# Patient Record
Sex: MALE | Race: BLACK OR AFRICAN AMERICAN | Employment: OTHER | URBAN - METROPOLITAN AREA
[De-identification: names, ages, dates, MRNs, and addresses within clinical notes are randomized per-mention and may not be internally consistent; named-entity substitution may affect disease eponyms.]

---

## 2017-02-09 LAB
A-G RATIO,AGRAT: 1.5 RATIO (ref 1.1–2.6)
ALBUMIN SERPL-MCNC: 4.3 G/DL (ref 3.5–5)
ALP SERPL-CCNC: 55 U/L (ref 40–125)
ALT SERPL-CCNC: 16 U/L (ref 5–40)
AST SERPL W P-5'-P-CCNC: 14 U/L (ref 10–37)
BILIRUB SERPL-MCNC: 0.6 MG/DL (ref 0.2–1.2)
BILIRUBIN, DIRECT,CBIL: <0.2 MG/DL (ref 0–0.3)
CHOLEST SERPL-MCNC: 135 MG/DL (ref 110–200)
GLOBULIN,GLOB: 2.8 G/DL (ref 2–4)
HDLC SERPL-MCNC: 34 MG/DL (ref 40–59)
LDLC SERPL CALC-MCNC: 63 MG/DL (ref 50–99)
PROT SERPL-MCNC: 7.1 G/DL (ref 6.2–8.1)
TRIGL SERPL-MCNC: 192 MG/DL (ref 40–149)
VLDLC SERPL CALC-MCNC: 38 MG/DL (ref 8–30)

## 2017-02-10 ENCOUNTER — TELEPHONE (OUTPATIENT)
Dept: CARDIOLOGY CLINIC | Age: 67
End: 2017-02-10

## 2017-02-10 NOTE — TELEPHONE ENCOUNTER
----- Message from Jordan Laird MD sent at 2/10/2017  2:06 PM EST -----  Lab/test  reviewed  No significant abnormality  High tg-low carb diet

## 2017-02-16 ENCOUNTER — OFFICE VISIT (OUTPATIENT)
Dept: CARDIOLOGY CLINIC | Age: 67
End: 2017-02-16

## 2017-02-16 VITALS
HEIGHT: 73 IN | DIASTOLIC BLOOD PRESSURE: 76 MMHG | WEIGHT: 205 LBS | HEART RATE: 64 BPM | BODY MASS INDEX: 27.17 KG/M2 | SYSTOLIC BLOOD PRESSURE: 120 MMHG

## 2017-02-16 DIAGNOSIS — Z95.1 POSTSURGICAL AORTOCORONARY BYPASS STATUS: ICD-10-CM

## 2017-02-16 DIAGNOSIS — I10 ESSENTIAL HYPERTENSION, BENIGN: ICD-10-CM

## 2017-02-16 DIAGNOSIS — E78.5 HYPERLIPIDEMIA, UNSPECIFIED HYPERLIPIDEMIA TYPE: ICD-10-CM

## 2017-02-16 DIAGNOSIS — I25.10 ATHEROSCLEROSIS OF NATIVE CORONARY ARTERY OF NATIVE HEART WITHOUT ANGINA PECTORIS: Primary | ICD-10-CM

## 2017-02-16 RX ORDER — METOPROLOL TARTRATE 50 MG/1
50 TABLET ORAL 2 TIMES DAILY
Qty: 180 TAB | Refills: 3 | Status: SHIPPED | OUTPATIENT
Start: 2017-02-16 | End: 2018-01-29 | Stop reason: SDUPTHER

## 2017-02-16 NOTE — PROGRESS NOTES
HISTORY OF PRESENT ILLNESS  Lenn Claude is a 77 y.o. male. HPI Comments: Patient with cad,post cabg,htn,dm. On follow up patient denies any chest pains,sob, palpitation or other significant symptoms. Hypertension   The history is provided by the patient. This is a chronic problem. The problem occurs constantly. The problem has not changed since onset. Pertinent negatives include no chest pain and no shortness of breath. Cholesterol Problem   The history is provided by the patient. This is a chronic problem. The problem occurs constantly. The problem has not changed since onset. Pertinent negatives include no chest pain and no shortness of breath. Review of Systems   Constitutional: Negative for chills and fever. HENT: Negative for nosebleeds. Eyes: Negative for blurred vision and double vision. Respiratory: Negative for cough, hemoptysis, sputum production, shortness of breath and wheezing. Cardiovascular: Negative for chest pain, palpitations, orthopnea, claudication, leg swelling and PND. Gastrointestinal: Negative for heartburn, nausea and vomiting. Musculoskeletal: Negative for myalgias. Skin: Negative for rash. Neurological: Negative for dizziness and weakness. Endo/Heme/Allergies: Does not bruise/bleed easily. No family history on file.     Past Medical History   Diagnosis Date    Coronary atherosclerosis of native coronary artery      Stable, now post CABG    Essential hypertension, benign      stable    Other and unspecified hyperlipidemia      LDL less than 100, HDL 37, LFT normal    Postsurgical aortocoronary bypass status     Type II or unspecified type diabetes mellitus without mention of complication, not stated as uncontrolled 11/15/2013       Past Surgical History   Procedure Laterality Date    Hx coronary artery bypass graft         Social History   Substance Use Topics    Smoking status: Former Smoker     Packs/day: 0.25     Quit date: 1/1/2005   Aunia Patrice Smokeless tobacco: Never Used    Alcohol use No       No Known Allergies    Current Outpatient Prescriptions   Medication Sig    metoprolol tartrate (LOPRESSOR) 50 mg tablet Take 1 Tab by mouth two (2) times a day.  clopidogrel (PLAVIX) 75 mg tab TAKE 1 TABLET DAILY    canagliflozin (INVOKANA) 100 mg tablet Take 100 mg by mouth Daily (before breakfast).  simvastatin (ZOCOR) 20 mg tablet Take 1 Tab by mouth nightly.  insulin glargine (LANTUS) 100 unit/mL injection by SubCUTAneous route nightly.  losartan (COZAAR) 100 mg tablet Take 1 Tab by mouth daily. (Patient taking differently: Take 50 mg by mouth daily.)    omega-3 fatty acids-vitamin e (FISH OIL) 1,000 mg cap Take 1 Cap by mouth.  metFORMIN (GLUCOPHAGE) 1,000 mg tablet Take 1,000 mg by mouth two (2) times daily (with meals).  cholecalciferol, vitamin D3, (VITAMIN D3) 2,000 unit Tab Take  by mouth daily. No current facility-administered medications for this visit. Visit Vitals    /76    Pulse 64    Ht 6' 1\" (1.854 m)    Wt 93 kg (205 lb)    BMI 27.05 kg/m2         Physical Exam   Constitutional: He is oriented to person, place, and time. He appears well-developed and well-nourished. HENT:   Head: Normocephalic and atraumatic. Eyes: Conjunctivae are normal.   Neck: Neck supple. No JVD present. No tracheal deviation present. No thyromegaly present. Cardiovascular: Normal rate, regular rhythm and normal heart sounds. Exam reveals no gallop and no friction rub. No murmur heard. Pulmonary/Chest: Breath sounds normal. No respiratory distress. He has no wheezes. He has no rales. He exhibits no tenderness. Abdominal: Soft. There is no tenderness. Musculoskeletal: He exhibits no edema. Neurological: He is alert and oriented to person, place, and time. Skin: Skin is warm and dry. Psychiatric: He has a normal mood and affect. Mr. Gagandeep Aguila has a reminder for a \"due or due soon\" health maintenance.  I have asked that he contact his primary care provider for follow-up on this health maintenance. CARDIOLOGY STUDIES 2011 3/27/2009   Myocardial Perfusion Scan Result - abn scan, small reversible ischemia in Basal part of inferior wall, EF 63%   Cardiac Cath with PCI Result - EF 60%; Triple Bypass    Echocardiogram - Complete Result MILD LVH,NORMAL EF,DD -     Diagnosis: 2014  1. Positive EKG portion of exercise stress test with inferior and lateral ST depression. 2. Shortness of breath and reduced functional tolerance with exercise. 3. Nuclear imaging report to follow. NUCLEAR IMAGIN2014     Findings:   1. Post-stress imaging in short axis, horizontal and vertical long axis views reveals normal isotope uptake in all areas. 2. Resting images also show normal isotope uptake in all areas. 3. Gated images show normal left ventricular size, wall motion and systolic function. Ejection fraction is 75%. Diagnosis:   1. Normal scan. 2. No evidence of significant fixed or reversible defect suggesting ischemia or myocardial infarction noted from this nuclear study. I Have personally reviewed recent relevant labs available and discussed with patient  2017-lipid  Assessment       ICD-10-CM ICD-9-CM    1. Atherosclerosis of native coronary artery of native heart without angina pectoris I25.10 414.01     stable   2. Essential hypertension, benign I10 401.1 metoprolol tartrate (LOPRESSOR) 50 mg tablet    controlled   3. Postsurgical aortocoronary bypass status Z95.1 V45.81    4. Hyperlipidemia, unspecified hyperlipidemia type E78.5 272.4     ldl 60's       Medications Discontinued During This Encounter   Medication Reason    Polyethylene Glycol 3350 Powd Other    metoprolol (LOPRESSOR) 50 mg tablet Reorder       Orders Placed This Encounter    metoprolol tartrate (LOPRESSOR) 50 mg tablet     Sig: Take 1 Tab by mouth two (2) times a day.      Dispense:  180 Tab     Refill:  3     **Patient requests 90 days supply**       Follow-up Disposition:  Return in about 6 months (around 8/16/2017).

## 2017-02-16 NOTE — MR AVS SNAPSHOT
Visit Information Date & Time Provider Department Dept. Phone Encounter #  
 2/16/2017  9:30 AM Eve Crisostomo MD [facility] [phone] [id number] Follow-up Instructions Return in about 6 months (around 8/16/2017). Upcoming Health Maintenance Date Due Hepatitis C Screening 1950 HEMOGLOBIN A1C Q6M 1950 FOOT EXAM Q1 7/14/1960 MICROALBUMIN Q1 7/14/1960 EYE EXAM RETINAL OR DILATED Q1 7/14/1960 DTaP/Tdap/Td series (1 - Tdap) 7/14/1971 FOBT Q 1 YEAR AGE 50-75 7/14/2000 ZOSTER VACCINE AGE 60> 7/14/2010 GLAUCOMA SCREENING Q2Y 7/14/2015 Pneumococcal 65+ Low/Medium Risk (1 of 2 - PCV13) 7/14/2015 MEDICARE YEARLY EXAM 7/14/2015 INFLUENZA AGE 9 TO ADULT 8/1/2016 LIPID PANEL Q1 2/9/2018 Allergies as of 2/16/2017  Review Complete On: 2/16/2017 By: Eve Crisostomo MD  
 No Known Allergies Current Immunizations  Never Reviewed No immunizations on file. Not reviewed this visit You Were Diagnosed With   
  
 Codes Comments Atherosclerosis of native coronary artery of native heart without angina pectoris    -  Primary ICD-10-CM: I25.10 ICD-9-CM: 414.01 stable Essential hypertension, benign     ICD-10-CM: I10 
ICD-9-CM: 401.1 controlled Postsurgical aortocoronary bypass status     ICD-10-CM: Z95.1 ICD-9-CM: V45.81 Hyperlipidemia, unspecified hyperlipidemia type     ICD-10-CM: E78.5 ICD-9-CM: 272.4 ldl 60's Vitals BP Pulse Height(growth percentile) Weight(growth percentile) BMI Smoking Status 120/76 64 6' 1\" (1.854 m) 205 lb (93 kg) 27.05 kg/m2 Former Smoker BMI and BSA Data Body Mass Index Body Surface Area  
 27.05 kg/m 2 2.19 m 2 Preferred Pharmacy Pharmacy Name [address] [facility] [phone] Your Updated Medication List  
  
   
 This list is accurate as of: 2/16/17  9:47 AM.  Always use your most recent med list.  
  
  
  
  
 clopidogrel 75 mg Tab Commonly known as:  PLAVIX TAKE 1 TABLET DAILY FISH OIL 1,000 mg Cap Generic drug:  omega-3 fatty acids-vitamin e Take 1 Cap by mouth. insulin glargine 100 unit/mL injection Commonly known as:  LANTUS  
by SubCUTAneous route nightly. INVOKANA 100 mg tablet Generic drug:  canagliflozin Take 100 mg by mouth Daily (before breakfast). losartan 100 mg tablet Commonly known as:  COZAAR Take 1 Tab by mouth daily. metFORMIN 1,000 mg tablet Commonly known as:  GLUCOPHAGE Take 1,000 mg by mouth two (2) times daily (with meals). metoprolol tartrate 50 mg tablet Commonly known as:  LOPRESSOR Take 1 Tab by mouth two (2) times a day. simvastatin 20 mg tablet Commonly known as:  ZOCOR Take 1 Tab by mouth nightly. VITAMIN D3 2,000 unit Tab Generic drug:  cholecalciferol (vitamin D3) Take  by mouth daily. Prescriptions Sent to Pharmacy Refills  
 metoprolol tartrate (LOPRESSOR) 50 mg tablet 3 Sig: Take 1 Tab by mouth two (2) times a day. Class: Normal  
 Pharmacy: [address], [address] Ph #: [phone] Route: Oral  
  
Follow-up Instructions Return in about 6 months (around 8/16/2017). Introducing [facility]! Marissa Friedman introduces [employer] patient portal. Now you can access parts of your medical record, email your doctor's office, and request medication refills online. 1. In your internet browser, go to [email or web address] 2. Click on the First Time User? Click Here link in the Sign In box. You will see the New Member Sign Up page. 3. Enter your [employer] Access Code exactly as it appears below. You will not need to use this code after youve completed the sign-up process.  If you do not sign up before the expiration date, you must request a new code. · [employer] Access Code: [email or web address] Expires: 5/17/2017  9:15 AM 
 
4. Enter the last four digits of your Social Security Number (xxxx) and Date of Birth (mm/dd/yyyy) as indicated and click Submit. You will be taken to the next sign-up page. 5. Create a [employer] Hero ID. This will be your [employer] login ID and cannot be changed, so think of one that is secure and easy to remember. 6. Create a [employer] password. You can change your password at any time. 7. Enter your Password Reset Question and Answer. This can be used at a later time if you forget your password. 8. Enter your e-mail address. You will receive e-mail notification when new information is available in [address] 9. Click Sign Up. You can now view and download portions of your medical record. 10. Click the Download Summary menu link to download a portable copy of your medical information. If you have questions, please visit the Frequently Asked Questions section of the [employer] website. Remember, [employer] is NOT to be used for urgent needs. For medical emergencies, dial 911. Now available from your iPhone and Android! Please provide this summary of care documentation to your next provider. Your primary care clinician is listed as Jone Mack. If you have any questions after today's visit, please call [phone].

## 2017-02-16 NOTE — PROGRESS NOTES
1. Have you been to the ER, urgent care clinic since your last visit? Hospitalized since your last visit?       no    2. Have you seen or consulted any other health care providers outside of the [facility] since your last visit? Include any pap smears or colon screening.     yes, pcp     3. Since your last visit, have you had any of the following symptoms?       no            4. Have you had any blood work, X-rays or cardiac testing? Yes, [facility]    5. Where do you normally have your labs drawn? [facility]    6. Do you need any refills today?   yes

## 2017-06-02 RX ORDER — SIMVASTATIN 20 MG/1
TABLET, FILM COATED ORAL
Qty: 90 TAB | Refills: 2 | Status: SHIPPED | OUTPATIENT
Start: 2017-06-02 | End: 2018-05-03 | Stop reason: SDUPTHER

## 2017-08-18 ENCOUNTER — OFFICE VISIT (OUTPATIENT)
Dept: CARDIOLOGY CLINIC | Age: 67
End: 2017-08-18

## 2017-08-18 VITALS
HEIGHT: 73 IN | DIASTOLIC BLOOD PRESSURE: 73 MMHG | HEART RATE: 69 BPM | SYSTOLIC BLOOD PRESSURE: 114 MMHG | BODY MASS INDEX: 25.84 KG/M2 | WEIGHT: 195 LBS

## 2017-08-18 DIAGNOSIS — E11.9 TYPE 2 DIABETES MELLITUS WITHOUT COMPLICATION, WITH LONG-TERM CURRENT USE OF INSULIN (HCC): ICD-10-CM

## 2017-08-18 DIAGNOSIS — E78.5 HYPERLIPIDEMIA, UNSPECIFIED HYPERLIPIDEMIA TYPE: ICD-10-CM

## 2017-08-18 DIAGNOSIS — I10 ESSENTIAL HYPERTENSION, BENIGN: ICD-10-CM

## 2017-08-18 DIAGNOSIS — I25.10 ATHEROSCLEROSIS OF NATIVE CORONARY ARTERY OF NATIVE HEART WITHOUT ANGINA PECTORIS: Primary | ICD-10-CM

## 2017-08-18 DIAGNOSIS — Z79.4 TYPE 2 DIABETES MELLITUS WITHOUT COMPLICATION, WITH LONG-TERM CURRENT USE OF INSULIN (HCC): ICD-10-CM

## 2017-08-18 DIAGNOSIS — Z95.1 POSTSURGICAL AORTOCORONARY BYPASS STATUS: ICD-10-CM

## 2017-08-18 NOTE — PROGRESS NOTES
HISTORY OF PRESENT ILLNESS  Fatmata Gallo is a 79 y.o. male. HPI Comments: Patient with cad,post cabg,htn,dm. On follow up patient denies any chest pains,sob, palpitation or other significant symptoms. Hypertension   The history is provided by the patient. This is a chronic problem. The problem occurs constantly. The problem has not changed since onset. Pertinent negatives include no chest pain and no shortness of breath. Cholesterol Problem   The history is provided by the patient. This is a chronic problem. The problem occurs constantly. The problem has not changed since onset. Pertinent negatives include no chest pain and no shortness of breath. Review of Systems   Constitutional: Negative for chills and fever. HENT: Negative for nosebleeds. Eyes: Negative for blurred vision and double vision. Respiratory: Negative for cough, hemoptysis, sputum production, shortness of breath and wheezing. Cardiovascular: Negative for chest pain, palpitations, orthopnea, claudication, leg swelling and PND. Gastrointestinal: Negative for heartburn, nausea and vomiting. Musculoskeletal: Negative for myalgias. Skin: Negative for rash. Neurological: Negative for dizziness and weakness. Endo/Heme/Allergies: Does not bruise/bleed easily. No family history on file.     Past Medical History:   Diagnosis Date    Coronary atherosclerosis of native coronary artery     Stable, now post CABG    Essential hypertension, benign     stable    Other and unspecified hyperlipidemia     LDL less than 100, HDL 37, LFT normal    Postsurgical aortocoronary bypass status     Type II or unspecified type diabetes mellitus without mention of complication, not stated as uncontrolled 11/15/2013       Past Surgical History:   Procedure Laterality Date    HX CORONARY ARTERY BYPASS GRAFT         Social History   Substance Use Topics    Smoking status: Former Smoker     Packs/day: 0.25     Quit date: 1/1/2005   [facility] Smokeless tobacco: Never Used    Alcohol use No       No Known Allergies    Current Outpatient Prescriptions   Medication Sig    simvastatin (ZOCOR) 20 mg tablet TAKE 1 TABLET NIGHTLY    metoprolol tartrate (LOPRESSOR) 50 mg tablet Take 1 Tab by mouth two (2) times a day.  clopidogrel (PLAVIX) 75 mg tab TAKE 1 TABLET DAILY    canagliflozin (INVOKANA) 100 mg tablet Take 100 mg by mouth Daily (before breakfast).  insulin glargine (LANTUS) 100 unit/mL injection by SubCUTAneous route nightly.  losartan (COZAAR) 100 mg tablet Take 1 Tab by mouth daily. (Patient taking differently: Take 50 mg by mouth daily.)    omega-3 fatty acids-vitamin e (FISH OIL) 1,000 mg cap Take 1 Cap by mouth.  metFORMIN (GLUCOPHAGE) 1,000 mg tablet Take 1,000 mg by mouth two (2) times daily (with meals).  cholecalciferol, vitamin D3, (VITAMIN D3) 2,000 unit Tab Take  by mouth daily. No current facility-administered medications for this visit. Visit Vitals    /73    Pulse 69    Ht 6' 1\" (1.854 m)    Wt 88.5 kg (195 lb)    BMI 25.73 kg/m2         Physical Exam   Constitutional: He is oriented to person, place, and time. He appears well-developed and well-nourished. HENT:   Head: Normocephalic and atraumatic. Eyes: Conjunctivae are normal.   Neck: Neck supple. No JVD present. No tracheal deviation present. No thyromegaly present. Cardiovascular: Normal rate, regular rhythm and normal heart sounds. Exam reveals no gallop and no friction rub. No murmur heard. Pulmonary/Chest: Breath sounds normal. No respiratory distress. He has no wheezes. He has no rales. He exhibits no tenderness. Abdominal: Soft. There is no tenderness. Musculoskeletal: He exhibits no edema. Neurological: He is alert and oriented to person, place, and time. Skin: Skin is warm and dry. Psychiatric: He has a normal mood and affect. Mr. Veronica Steele has a reminder for a \"due or due soon\" health maintenance.  I have asked that he contact his primary care provider for follow-up on this health maintenance. CARDIOLOGY STUDIES 2011 3/27/2009   Myocardial Perfusion Scan Result - abn scan, small reversible ischemia in Basal part of inferior wall, EF 63%   Cardiac Cath with PCI Result - EF 60%; Triple Bypass    Echocardiogram - Complete Result MILD LVH,NORMAL EF,DD -     Diagnosis: 2014  1. Positive EKG portion of exercise stress test with inferior and lateral ST depression. 2. Shortness of breath and reduced functional tolerance with exercise. 3. Nuclear imaging report to follow. NUCLEAR [email or web address]     Findings:   1. Post-stress imaging in short axis, horizontal and vertical long axis views reveals normal isotope uptake in all areas. 2. Resting images also show normal isotope uptake in all areas. 3. Gated images show normal left ventricular size, wall motion and systolic function. Ejection fraction is 75%. Diagnosis:   1. Normal scan. 2. No evidence of significant fixed or reversible defect suggesting ischemia or myocardial infarction noted from this nuclear study. I Have personally reviewed recent relevant labs available and discussed with patient  2017-lipid  Assessment       ICD-10-CM ICD-9-CM    1. Atherosclerosis of native coronary artery of native heart without angina pectoris I25.10 414.01     stable   2. Essential hypertension, benign I10 401.1     controlled   3. Hyperlipidemia, unspecified hyperlipidemia type E78.5 272.4     ldl 60's  high tg-low carb diet   4. Postsurgical aortocoronary bypass status Z95.1 V45.81    5. Type 2 diabetes mellitus without complication, with long-term current use of insulin ([facility]) E11.9 250.00     Z79.4 V58.67     high hg a1c  discussed  diet       There are no discontinued medications. No orders of the defined types were placed in this encounter. Follow-up Disposition:  Return in about 6 months (around 2018).

## 2017-08-18 NOTE — PROGRESS NOTES
1. Have you been to the ER, urgent care clinic since your last visit? Hospitalized since your last visit? No    2. Have you seen or consulted any other health care providers outside of the [address] [place] [address] since your last visit? Include any pap smears or colon screening. Yes Where: PCP     3. Since your last visit, have you had any of the following symptoms? .          4. Have you had any blood work, X-rays or cardiac testing? No              5.  Where do you normally have your labs drawn? [facility]    6. Do you need any refills today?    No

## 2017-08-18 NOTE — LETTER
Yvrose Cartagena 1950 8/18/2017 Dear [place] Schwab, MD Cherrie Mane, MD Leslye Bride, MD 
 
I had the pleasure of evaluating  Mr. Emory Bean in office today. Below are the relevant portions of my assessment and plan of care. ICD-10-CM ICD-9-CM 1. Atherosclerosis of native coronary artery of native heart without angina pectoris I25.10 414.01   
 stable 2. Essential hypertension, benign I10 401.1   
 controlled 3. Hyperlipidemia, unspecified hyperlipidemia type E78.5 272.4   
 ldl 60's 
high tg-low carb diet 4. Postsurgical aortocoronary bypass status Z95.1 V45.81   
5. Type 2 diabetes mellitus without complication, with long-term current use of insulin ([facility]) E11.9 250.00   
 Z79.4 V58.67   
 high hg a1c 
discussed  diet Current Outpatient Prescriptions Medication Sig Dispense Refill  simvastatin (ZOCOR) 20 mg tablet TAKE 1 TABLET NIGHTLY 90 Tab 2  
 metoprolol tartrate (LOPRESSOR) 50 mg tablet Take 1 Tab by mouth two (2) times a day. 180 Tab 3  clopidogrel (PLAVIX) 75 mg tab TAKE 1 TABLET DAILY 90 Tab 2  
 canagliflozin (INVOKANA) 100 mg tablet Take 100 mg by mouth Daily (before breakfast).  insulin glargine (LANTUS) 100 unit/mL injection by SubCUTAneous route nightly.  losartan (COZAAR) 100 mg tablet Take 1 Tab by mouth daily. (Patient taking differently: Take 50 mg by mouth daily.) 90 Tab 3  
 omega-3 fatty acids-vitamin e (FISH OIL) 1,000 mg cap Take 1 Cap by mouth.  metFORMIN (GLUCOPHAGE) 1,000 mg tablet Take 1,000 mg by mouth two (2) times daily (with meals).  cholecalciferol, vitamin D3, (VITAMIN D3) 2,000 unit Tab Take  by mouth daily. No orders of the defined types were placed in this encounter. If you have questions, please do not hesitate to call me. I look forward to following Mr. Emory Bean along with you. Sincerely, Becky Davis MD

## 2017-08-18 NOTE — MR AVS SNAPSHOT
Visit Information Date & Time Provider Department Dept. Phone Encounter #  
 8/18/2017  9:30 AM Edilma Mcneill MD [facility] [phone] Follow-up Instructions Return in about 6 months (around 2/18/2018). Upcoming Health Maintenance Date Due Hepatitis C Screening 1950 HEMOGLOBIN A1C Q6M 1950 FOOT EXAM Q1 7/14/1960 MICROALBUMIN Q1 7/14/1960 EYE EXAM RETINAL OR DILATED Q1 7/14/1960 DTaP/Tdap/Td series (1 - Tdap) 7/14/1971 FOBT Q 1 YEAR AGE 50-75 7/14/2000 ZOSTER VACCINE AGE 60> 5/14/2010 GLAUCOMA SCREENING Q2Y 7/14/2015 Pneumococcal 65+ Low/Medium Risk (1 of 2 - PCV13) 7/14/2015 MEDICARE YEARLY EXAM 7/14/2015 INFLUENZA AGE 9 TO ADULT 8/1/2017 LIPID PANEL Q1 2/9/2018 Allergies as of 8/18/2017  Review Complete On: 8/18/2017 By: Edilma Mcneill MD  
 No Known Allergies Current Immunizations  Never Reviewed No immunizations on file. Not reviewed this visit You Were Diagnosed With   
  
 Codes Comments Atherosclerosis of native coronary artery of native heart without angina pectoris    -  Primary ICD-10-CM: I25.10 ICD-9-CM: 414.01 stable Essential hypertension, benign     ICD-10-CM: I10 
ICD-9-CM: 401.1 controlled Hyperlipidemia, unspecified hyperlipidemia type     ICD-10-CM: E78.5 ICD-9-CM: 272.4 ldl 60's 
high tg-low carb diet Postsurgical aortocoronary bypass status     ICD-10-CM: Z95.1 ICD-9-CM: V45.81 Type 2 diabetes mellitus without complication, with long-term current use of insulin (HCC)     ICD-10-CM: E11.9, Z79.4 ICD-9-CM: 250.00, V58.67 high hg a1c 
discussed  diet Vitals BP Pulse Height(growth percentile) Weight(growth percentile) BMI Smoking Status 114/73 69 6' 1\" (1.854 m) 195 lb (88.5 kg) 25.73 kg/m2 Former Smoker Vitals History BMI and BSA Data Body Mass Index Body Surface Area 25.73 kg/m 2 2.14 m 2 Preferred Pharmacy Pharmacy Name Phone [address], [facility] [phone] Your Updated Medication List  
  
   
This list is accurate as of: 8/18/17  9:59 AM.  Always use your most recent med list.  
  
  
  
  
 clopidogrel 75 mg Tab Commonly known as:  PLAVIX TAKE 1 TABLET DAILY FISH OIL 1,000 mg Cap Generic drug:  omega-3 fatty acids-vitamin e Take 1 Cap by mouth. insulin glargine 100 unit/mL injection Commonly known as:  LANTUS  
by SubCUTAneous route nightly. INVOKANA 100 mg tablet Generic drug:  canagliflozin Take 100 mg by mouth Daily (before breakfast). losartan 100 mg tablet Commonly known as:  COZAAR Take 1 Tab by mouth daily. metFORMIN 1,000 mg tablet Commonly known as:  GLUCOPHAGE Take 1,000 mg by mouth two (2) times daily (with meals). metoprolol tartrate 50 mg tablet Commonly known as:  LOPRESSOR Take 1 Tab by mouth two (2) times a day. simvastatin 20 mg tablet Commonly known as:  ZOCOR  
TAKE 1 TABLET NIGHTLY  
  
 VITAMIN D3 2,000 unit Tab Generic drug:  cholecalciferol (vitamin D3) Take  by mouth daily. Follow-up Instructions Return in about 6 months (around 2/18/2018). Introducing [facility] & HEALTH SERVICES! Wilton Huntley introduces [employer] patient portal. Now you can access parts of your medical record, email your doctor's office, and request medication refills online. 1. In your internet browser, go to [email or web address] 2. Click on the First Time User? Click Here link in the Sign In box. You will see the New Member Sign Up page. 3. Enter your [employer] Access Code exactly as it appears below. You will not need to use this code after youve completed the sign-up process. If you do not sign up before the expiration date, you must request a new code. · KKBOX Access Code: [email or web address] Expires: 11/16/2017  9:19 AM 
 
 4. Enter the last four digits of your Social Security Number (xxxx) and Date of Birth (mm/dd/yyyy) as indicated and click Submit. You will be taken to the next sign-up page. 5. Create a Guest[employer] Network ID. This will be your Guest[employer] login ID and cannot be changed, so think of one that is secure and easy to remember. 6. Create a [employer] Network password. You can change your password at any time. 7. Enter your Password Reset Question and Answer. This can be used at a later time if you forget your password. 8. Enter your e-mail address. You will receive e-mail notification when new information is available in [address] 9. Click Sign Up. You can now view and download portions of your medical record. 10. Click the Download Summary menu link to download a portable copy of your medical information. If you have questions, please visit the Frequently Asked Questions section of the [employer] website. Remember, [employer] is NOT to be used for urgent needs. For medical emergencies, dial 911. Now available from your iPhone and Android! Please provide this summary of care documentation to your next provider. Your primary care clinician is listed as Jaky Lamas. If you have any questions after today's visit, please call [phone].

## 2017-09-26 RX ORDER — CLOPIDOGREL BISULFATE 75 MG/1
TABLET ORAL
Qty: 90 TAB | Refills: 2 | Status: SHIPPED | OUTPATIENT
Start: 2017-09-26 | End: 2018-07-05 | Stop reason: SDUPTHER

## 2018-01-29 DIAGNOSIS — I10 ESSENTIAL HYPERTENSION, BENIGN: ICD-10-CM

## 2018-01-29 RX ORDER — METOPROLOL TARTRATE 50 MG/1
TABLET ORAL
Qty: 180 TAB | Refills: 3 | Status: SHIPPED | OUTPATIENT
Start: 2018-01-29 | End: 2019-01-06 | Stop reason: SDUPTHER

## 2018-02-02 ENCOUNTER — OFFICE VISIT (OUTPATIENT)
Dept: CARDIOLOGY CLINIC | Age: 68
End: 2018-02-02

## 2018-02-02 VITALS
HEART RATE: 60 BPM | SYSTOLIC BLOOD PRESSURE: 146 MMHG | HEIGHT: 73 IN | DIASTOLIC BLOOD PRESSURE: 73 MMHG | WEIGHT: 207 LBS | BODY MASS INDEX: 27.43 KG/M2

## 2018-02-02 DIAGNOSIS — I25.10 ATHEROSCLEROSIS OF NATIVE CORONARY ARTERY OF NATIVE HEART WITHOUT ANGINA PECTORIS: Primary | ICD-10-CM

## 2018-02-02 DIAGNOSIS — Z95.1 POSTSURGICAL AORTOCORONARY BYPASS STATUS: ICD-10-CM

## 2018-02-02 DIAGNOSIS — I10 ESSENTIAL HYPERTENSION, BENIGN: ICD-10-CM

## 2018-02-02 DIAGNOSIS — Z79.4 TYPE 2 DIABETES MELLITUS WITHOUT COMPLICATION, WITH LONG-TERM CURRENT USE OF INSULIN (HCC): ICD-10-CM

## 2018-02-02 DIAGNOSIS — E78.5 HYPERLIPIDEMIA, UNSPECIFIED HYPERLIPIDEMIA TYPE: ICD-10-CM

## 2018-02-02 DIAGNOSIS — E11.9 TYPE 2 DIABETES MELLITUS WITHOUT COMPLICATION, WITH LONG-TERM CURRENT USE OF INSULIN (HCC): ICD-10-CM

## 2018-02-02 RX ORDER — AMLODIPINE BESYLATE 5 MG/1
5 TABLET ORAL DAILY
Qty: 90 TAB | Refills: 3 | Status: SHIPPED | OUTPATIENT
Start: 2018-02-02 | End: 2019-03-08 | Stop reason: SDUPTHER

## 2018-02-02 NOTE — LETTER
Cinda Timmons 1950 2/2/2018 Dear MD Jeovanny Solis MD Vaughan Orchard, MD 
 
I had the pleasure of evaluating  Mr. Conner Meza in office today. Below are the relevant portions of my assessment and plan of care. ICD-10-CM ICD-9-CM 1. Atherosclerosis of native coronary artery of native heart without angina pectoris I25.10 414.01 LIPID PANEL  
   HEPATIC FUNCTION PANEL  
 stable 2. Essential hypertension, benign I10 401.1   
 mildly elevated 
add amlodipine 3. Hyperlipidemia, unspecified hyperlipidemia type E78.5 272.4   
 on statin 4. Postsurgical aortocoronary bypass status Z95.1 V45.81   
 stable 5. Type 2 diabetes mellitus without complication, with long-term current use of insulin (HCC) E11.9 250.00   
 Z79.4 V58.67 Current Outpatient Prescriptions Medication Sig Dispense Refill  amLODIPine (NORVASC) 5 mg tablet Take 1 Tab by mouth daily. 90 Tab 3  
 metoprolol tartrate (LOPRESSOR) 50 mg tablet TAKE 1 TABLET TWICE A  Tab 3  clopidogrel (PLAVIX) 75 mg tab TAKE 1 TABLET DAILY 90 Tab 2  
 simvastatin (ZOCOR) 20 mg tablet TAKE 1 TABLET NIGHTLY 90 Tab 2  
 canagliflozin (INVOKANA) 100 mg tablet Take 100 mg by mouth Daily (before breakfast).  insulin glargine (LANTUS) 100 unit/mL injection by SubCUTAneous route nightly.  losartan (COZAAR) 100 mg tablet Take 1 Tab by mouth daily. (Patient taking differently: Take 50 mg by mouth daily.) 90 Tab 3  
 omega-3 fatty acids-vitamin e (FISH OIL) 1,000 mg cap Take 1 Cap by mouth.  metFORMIN (GLUCOPHAGE) 1,000 mg tablet Take 1,000 mg by mouth two (2) times daily (with meals).  cholecalciferol, vitamin D3, (VITAMIN D3) 2,000 unit Tab Take  by mouth daily. Orders Placed This Encounter  LIPID PANEL Standing Status:   Future Standing Expiration Date:   8/3/2018  
 HEPATIC FUNCTION PANEL Standing Status:   Future Standing Expiration Date:   8/3/2018  amLODIPine (NORVASC) 5 mg tablet Sig: Take 1 Tab by mouth daily. Dispense:  90 Tab Refill:  3 If you have questions, please do not hesitate to call me. I look forward to following Mr. Jeff Castillo along with you. Sincerely, Jhony Smith MD

## 2018-02-02 NOTE — MR AVS SNAPSHOT
[address] [facility] 
 
 
 [address] [facility] 
[phone] Patient: Katlin Millan MRN: [id number] EUO:[id number] Visit Information Date & Time Provider Department Dept. Phone Encounter #  
 2/2/2018  9:00 AM Derian Alexander MD [facility] [phone] [id number] Follow-up Instructions Return in about 6 months (around 8/2/2018). Your Appointments 8/16/2018  9:00 AM  
Follow Up with Derian Alexander MD  
[facility] ([facility]) Appt Note: 6 month [address] [facility] [address]  
  
   
 [address] [address] [address] [place] [address] Upcoming Health Maintenance Date Due Hepatitis C Screening 1950 HEMOGLOBIN A1C Q6M 1950 FOOT EXAM Q1 7/14/1960 MICROALBUMIN Q1 7/14/1960 EYE EXAM RETINAL OR DILATED Q1 7/14/1960 DTaP/Tdap/Td series (1 - Tdap) 7/14/1971 FOBT Q 1 YEAR AGE 50-75 7/14/2000 ZOSTER VACCINE AGE 60> 5/14/2010 GLAUCOMA SCREENING Q2Y 7/14/2015 Pneumococcal 65+ Low/Medium Risk (1 of 2 - PCV13) 7/14/2015 MEDICARE YEARLY EXAM 7/14/2015 Influenza Age 5 to Adult 8/1/2017 LIPID PANEL Q1 2/9/2018 Allergies as of 2/2/2018  Review Complete On: 2/2/2018 By: Derian Alexander MD  
 No Known Allergies Current Immunizations  Never Reviewed No immunizations on file. Not reviewed this visit You Were Diagnosed With   
  
 Codes Comments Atherosclerosis of native coronary artery of native heart without angina pectoris    -  Primary ICD-10-CM: I25.10 ICD-9-CM: 414.01 stable Essential hypertension, benign     ICD-10-CM: I10 
ICD-9-CM: 401.1 mildly elevated 
add amlodipine Hyperlipidemia, unspecified hyperlipidemia type     ICD-10-CM: E78.5 ICD-9-CM: 272.4 on statin Postsurgical aortocoronary bypass status     ICD-10-CM: Z95.1 ICD-9-CM: V45.81 stable Type 2 diabetes mellitus without complication, with long-term current use of insulin (HCC)     ICD-10-CM: E11.9, Z79.4 ICD-9-CM: 250.00, V58.67 Vitals BP Pulse Height(growth percentile) Weight(growth percentile) BMI Smoking Status 146/73 60 6' 1\" (1.854 m) 207 lb (93.9 kg) 27.31 kg/m2 Former Smoker Vitals History BMI and BSA Data Body Mass Index Body Surface Area  
 27.31 kg/m 2 2.2 m 2 Preferred Pharmacy Pharmacy Name Phone [address] [facility] [phone] Your Updated Medication List  
  
   
This list is accurate as of: 2/2/18  9:10 AM.  Always use your most recent med list. amLODIPine 5 mg tablet Commonly known as:  Yeyo Nearing Take 1 Tab by mouth daily. clopidogrel 75 mg Tab Commonly known as:  PLAVIX TAKE 1 TABLET DAILY FISH OIL 1,000 mg Cap Generic drug:  omega-3 fatty acids-vitamin e Take 1 Cap by mouth. insulin glargine 100 unit/mL injection Commonly known as:  LANTUS  
by SubCUTAneous route nightly. INVOKANA 100 mg tablet Generic drug:  canagliflozin Take 100 mg by mouth Daily (before breakfast). losartan 100 mg tablet Commonly known as:  COZAAR Take 1 Tab by mouth daily. metFORMIN 1,000 mg tablet Commonly known as:  GLUCOPHAGE Take 1,000 mg by mouth two (2) times daily (with meals). metoprolol tartrate 50 mg tablet Commonly known as:  LOPRESSOR  
TAKE 1 TABLET TWICE A DAY  
  
 simvastatin 20 mg tablet Commonly known as:  ZOCOR  
TAKE 1 TABLET NIGHTLY  
  
 VITAMIN D3 2,000 unit Tab Generic drug:  cholecalciferol (vitamin D3) Take  by mouth daily. Prescriptions Sent to Pharmacy Refills  
 amLODIPine (NORVASC) 5 mg tablet 3 Sig: Take 1 Tab by mouth daily. Class: Normal  
 Pharmacy: [address], [address] [place] [address] #: [phone]  Route: Oral  
  
 Follow-up Instructions Return in about 6 months (around 8/2/2018). To-Do List   
 02/02/2018 Lab:  HEPATIC FUNCTION PANEL   
  
 02/02/2018 Lab:  LIPID PANEL Introducing [facility] SERVICES! Carmelita Fothergill introduces [employer]ial[employer] patient portal. Now you can access parts of your medical record, email your doctor's office, and request medication refills online. 1. In your internet browser, go to [email or web address] 2. Click on the First Time User? Click Here link in the Sign In box. You will see the New Member Sign Up page. 3. Enter your AxialMED Access Code exactly as it appears below. You will not need to use this code after youve completed the sign-up process. If you do not sign up before the expiration date, you must request a new code. · [employer]ial[employer] Access Code: [email or web address] Expires: 5/3/2018  8:46 AM 
 
4. Enter the last four digits of your Social Security Number (xxxx) and Date of Birth (mm/dd/yyyy) as indicated and click Submit. You will be taken to the next sign-up page. 5. Create a AxialMED ID. This will be your [employer]ialMED login ID and cannot be changed, so think of one that is secure and easy to remember. 6. Create a [employer]ial[employer] password. You can change your password at any time. 7. Enter your Password Reset Question and Answer. This can be used at a later time if you forget your password. 8. Enter your e-mail address. You will receive e-mail notification when new information is available in [address] 9. Click Sign Up. You can now view and download portions of your medical record. 10. Click the Download Summary menu link to download a portable copy of your medical information. If you have questions, please visit the Frequently Asked Questions section of the Axial[employer] website. Remember, [employer] is NOT to be used for urgent needs. For medical emergencies, dial 911. Now available from your iPhone and Android! Please provide this summary of care documentation to your next provider. Your primary care clinician is listed as Anupmaa Lopez. If you have any questions after today's visit, please call [phone].

## 2018-02-02 NOTE — PROGRESS NOTES
HISTORY OF PRESENT ILLNESS  Huey Rodriguez is a 79 y.o. male. HPI Comments: Patient with cad,post cabg,htn,dm. On follow up patient denies any chest pains,sob, palpitation or other significant symptoms. Hypertension   The history is provided by the patient. This is a chronic problem. The problem occurs constantly. The problem has not changed since onset. Pertinent negatives include no chest pain and no shortness of breath. Cholesterol Problem   The history is provided by the patient. This is a chronic problem. The problem occurs constantly. The problem has not changed since onset. Pertinent negatives include no chest pain and no shortness of breath. Review of Systems   Constitutional: Negative for chills and fever. HENT: Negative for nosebleeds. Eyes: Negative for blurred vision and double vision. Respiratory: Negative for cough, hemoptysis, sputum production, shortness of breath and wheezing. Cardiovascular: Negative for chest pain, palpitations, orthopnea, claudication, leg swelling and PND. Gastrointestinal: Negative for heartburn, nausea and vomiting. Musculoskeletal: Negative for myalgias. Skin: Negative for rash. Neurological: Negative for dizziness and weakness. Endo/Heme/Allergies: Does not bruise/bleed easily. No family history on file.     Past Medical History:   Diagnosis Date    Coronary atherosclerosis of native coronary artery     Stable, now post CABG    Essential hypertension, benign     stable    Other and unspecified hyperlipidemia     LDL less than 100, HDL 37, LFT normal    Postsurgical aortocoronary bypass status     Type II or unspecified type diabetes mellitus without mention of complication, not stated as uncontrolled 11/15/2013       Past Surgical History:   Procedure Laterality Date    HX CORONARY ARTERY BYPASS GRAFT         Social History   Substance Use Topics    Smoking status: Former Smoker     Packs/day: 0.25     Quit date: 1/1/2005   [facility] Smokeless tobacco: Never Used    Alcohol use No       No Known Allergies    Current Outpatient Prescriptions   Medication Sig    amLODIPine (NORVASC) 5 mg tablet Take 1 Tab by mouth daily.  metoprolol tartrate (LOPRESSOR) 50 mg tablet TAKE 1 TABLET TWICE A DAY    clopidogrel (PLAVIX) 75 mg tab TAKE 1 TABLET DAILY    simvastatin (ZOCOR) 20 mg tablet TAKE 1 TABLET NIGHTLY    canagliflozin (INVOKANA) 100 mg tablet Take 100 mg by mouth Daily (before breakfast).  insulin glargine (LANTUS) 100 unit/mL injection by SubCUTAneous route nightly.  losartan (COZAAR) 100 mg tablet Take 1 Tab by mouth daily. (Patient taking differently: Take 50 mg by mouth daily.)    omega-3 fatty acids-vitamin e (FISH OIL) 1,000 mg cap Take 1 Cap by mouth.  metFORMIN (GLUCOPHAGE) 1,000 mg tablet Take 1,000 mg by mouth two (2) times daily (with meals).  cholecalciferol, vitamin D3, (VITAMIN D3) 2,000 unit Tab Take  by mouth daily. No current facility-administered medications for this visit. Visit Vitals    /73    Pulse 60    Ht 6' 1\" (1.854 m)    Wt 93.9 kg (207 lb)    BMI 27.31 kg/m2         Physical Exam   Constitutional: He is oriented to person, place, and time. He appears well-developed and well-nourished. HENT:   Head: Normocephalic and atraumatic. Eyes: Conjunctivae are normal.   Neck: Neck supple. No JVD present. No tracheal deviation present. No thyromegaly present. Cardiovascular: Normal rate, regular rhythm and normal heart sounds. Exam reveals no gallop and no friction rub. No murmur heard. Pulmonary/Chest: Breath sounds normal. No respiratory distress. He has no wheezes. He has no rales. He exhibits no tenderness. Abdominal: Soft. There is no tenderness. Musculoskeletal: He exhibits no edema. Neurological: He is alert and oriented to person, place, and time. Skin: Skin is warm and dry. Psychiatric: He has a normal mood and affect.      Mr. Reyes Sicilian has a reminder for a \"due or due soon\" health maintenance. I have asked that he contact his primary care provider for follow-up on this health maintenance. CARDIOLOGY STUDIES 2011 3/27/2009   Myocardial Perfusion Scan Result - abn scan, small reversible ischemia in Basal part of inferior wall, EF 63%   Cardiac Cath with PCI Result - EF 60%; Triple Bypass    Echocardiogram - Complete Result MILD LVH,NORMAL EF,DD -     Diagnosis: 2014  1. Positive EKG portion of exercise stress test with inferior and lateral ST depression. 2. Shortness of breath and reduced functional tolerance with exercise. 3. Nuclear imaging report to follow. NUCLEAR IMAGIN2014     Findings:   1. Post-stress imaging in short axis, horizontal and vertical long axis views reveals normal isotope uptake in all areas. 2. Resting images also show normal isotope uptake in all areas. 3. Gated images show normal left ventricular size, wall motion and systolic function. Ejection fraction is 75%. Diagnosis:   1. Normal scan. 2. No evidence of significant fixed or reversible defect suggesting ischemia or myocardial infarction noted from this nuclear study. I Have personally reviewed recent relevant labs available and discussed with patient  2017-lipid  Assessment       ICD-10-CM ICD-9-CM    1. Atherosclerosis of native coronary artery of native heart without angina pectoris I25.10 414.01 LIPID PANEL      HEPATIC FUNCTION PANEL    stable   2. Essential hypertension, benign I10 401.1     mildly elevated  add amlodipine   3. Hyperlipidemia, unspecified hyperlipidemia type E78.5 272.4     on statin   4. Postsurgical aortocoronary bypass status Z95.1 V45.81     stable   5. Type 2 diabetes mellitus without complication, with long-term current use of insulin ([facility]) E11.9 250.00     Z79.4 V58.67      2017  Consider stress est at next visit  There are no discontinued medications.     Orders Placed This Encounter    LIPID PANEL     Standing Status:   Future     Standing Expiration Date:   8/3/2018    HEPATIC FUNCTION PANEL     Standing Status:   Future     Standing Expiration Date:   8/3/2018    amLODIPine (NORVASC) 5 mg tablet     Sig: Take 1 Tab by mouth daily. Dispense:  90 Tab     Refill:  3       Follow-up Disposition:  Return in about 6 months (around 8/2/2018).

## 2018-02-02 NOTE — PROGRESS NOTES
1. Have you been to the ER, urgent care clinic since your last visit? Hospitalized since your last visit? No    2. Have you seen or consulted any other health care providers outside of the [address] [place] [address] since your last visit? Include any pap smears or colon screening. Yes Where: PCP     3. Since your last visit, have you had any of the following symptoms? .         4. Have you had any blood work, X-rays or cardiac testing? No          5. Where do you normally have your labs drawn? [facility]    6. Do you need any refills today?    No

## 2018-05-03 RX ORDER — SIMVASTATIN 20 MG/1
TABLET, FILM COATED ORAL
Qty: 90 TAB | Refills: 2 | Status: SHIPPED | OUTPATIENT
Start: 2018-05-03 | End: 2019-01-06 | Stop reason: SDUPTHER

## 2018-07-05 RX ORDER — CLOPIDOGREL BISULFATE 75 MG/1
TABLET ORAL
Qty: 90 TAB | Refills: 2 | Status: SHIPPED | OUTPATIENT
Start: 2018-07-05 | End: 2019-03-29 | Stop reason: SDUPTHER

## 2018-08-09 LAB
A-G RATIO,AGRAT: 1.5 RATIO (ref 1.1–2.6)
ALBUMIN SERPL-MCNC: 4.2 G/DL (ref 3.5–5)
ALP SERPL-CCNC: 54 U/L (ref 40–125)
ALT SERPL-CCNC: 13 U/L (ref 5–40)
AST SERPL W P-5'-P-CCNC: 14 U/L (ref 10–37)
BILIRUB SERPL-MCNC: 0.9 MG/DL (ref 0.2–1.2)
BILIRUBIN, DIRECT,CBIL: <0.2 MG/DL (ref 0–0.3)
CHOLEST SERPL-MCNC: 108 MG/DL (ref 110–200)
GLOBULIN,GLOB: 2.8 G/DL (ref 2–4)
HDLC SERPL-MCNC: 3 MG/DL (ref 0–5)
HDLC SERPL-MCNC: 36 MG/DL (ref 40–59)
LDLC SERPL CALC-MCNC: 52 MG/DL (ref 50–99)
PROT SERPL-MCNC: 7 G/DL (ref 6.2–8.1)
TRIGL SERPL-MCNC: 98 MG/DL (ref 40–149)
VLDLC SERPL CALC-MCNC: 20 MG/DL (ref 8–30)

## 2018-08-10 ENCOUNTER — TELEPHONE (OUTPATIENT)
Dept: CARDIOLOGY CLINIC | Age: 68
End: 2018-08-10

## 2018-08-10 NOTE — TELEPHONE ENCOUNTER
Called and left message on patient voicemail regarding lab/test results, Per Dr. Yvonne Palmer no significant abnormality. If any questions to call office.

## 2018-08-10 NOTE — TELEPHONE ENCOUNTER
----- Message from Vamsi Tenorio MD sent at 8/10/2018 11:12 AM EDT -----  Lab/test  reviewed  No significant abnormality

## 2018-08-16 ENCOUNTER — OFFICE VISIT (OUTPATIENT)
Dept: CARDIOLOGY CLINIC | Age: 68
End: 2018-08-16

## 2018-08-16 VITALS
SYSTOLIC BLOOD PRESSURE: 117 MMHG | DIASTOLIC BLOOD PRESSURE: 65 MMHG | HEIGHT: 73 IN | BODY MASS INDEX: 26.64 KG/M2 | WEIGHT: 201 LBS | HEART RATE: 66 BPM

## 2018-08-16 DIAGNOSIS — I10 ESSENTIAL HYPERTENSION, BENIGN: ICD-10-CM

## 2018-08-16 DIAGNOSIS — E78.5 HYPERLIPIDEMIA, UNSPECIFIED HYPERLIPIDEMIA TYPE: ICD-10-CM

## 2018-08-16 DIAGNOSIS — Z95.1 POSTSURGICAL AORTOCORONARY BYPASS STATUS: ICD-10-CM

## 2018-08-16 DIAGNOSIS — Z79.4 TYPE 2 DIABETES MELLITUS WITHOUT COMPLICATION, WITH LONG-TERM CURRENT USE OF INSULIN (HCC): ICD-10-CM

## 2018-08-16 DIAGNOSIS — I25.10 ATHEROSCLEROSIS OF NATIVE CORONARY ARTERY OF NATIVE HEART WITHOUT ANGINA PECTORIS: Primary | ICD-10-CM

## 2018-08-16 DIAGNOSIS — E11.9 TYPE 2 DIABETES MELLITUS WITHOUT COMPLICATION, WITH LONG-TERM CURRENT USE OF INSULIN (HCC): ICD-10-CM

## 2018-08-16 NOTE — PROGRESS NOTES
HISTORY OF PRESENT ILLNESS  Irasema Abdalla is a 76 y.o. male. HPI Comments: Patient with cad,post cabg,htn,dm. On follow up patient denies any chest pains,sob, palpitation or other significant symptoms. Hypertension   The history is provided by the patient. This is a chronic problem. The problem occurs constantly. The problem has not changed since onset. Pertinent negatives include no chest pain and no shortness of breath. Cholesterol Problem   The history is provided by the patient. This is a chronic problem. The problem occurs constantly. The problem has not changed since onset. Pertinent negatives include no chest pain and no shortness of breath. Review of Systems   Constitutional: Negative for chills and fever. HENT: Negative for nosebleeds. Eyes: Negative for blurred vision and double vision. Respiratory: Negative for cough, hemoptysis, sputum production, shortness of breath and wheezing. Cardiovascular: Negative for chest pain, palpitations, orthopnea, claudication, leg swelling and PND. Gastrointestinal: Negative for heartburn, nausea and vomiting. Musculoskeletal: Negative for myalgias. Skin: Negative for rash. Neurological: Negative for dizziness and weakness. Endo/Heme/Allergies: Does not bruise/bleed easily. No family history on file.     Past Medical History:   Diagnosis Date    Coronary atherosclerosis of native coronary artery     Stable, now post CABG    Essential hypertension, benign     stable    Other and unspecified hyperlipidemia     LDL less than 100, HDL 37, LFT normal    Postsurgical aortocoronary bypass status     Type II or unspecified type diabetes mellitus without mention of complication, not stated as uncontrolled 11/15/2013       Past Surgical History:   Procedure Laterality Date    HX CORONARY ARTERY BYPASS GRAFT         Social History   Substance Use Topics    Smoking status: Former Smoker     Packs/day: 0.25     Quit date: 1/1/2005   [address] [place] [address] Smokeless tobacco: Never Used    Alcohol use No       No Known Allergies    Current Outpatient Prescriptions   Medication Sig    clopidogrel (PLAVIX) 75 mg tab TAKE 1 TABLET DAILY    simvastatin (ZOCOR) 20 mg tablet TAKE 1 TABLET NIGHTLY    amLODIPine (NORVASC) 5 mg tablet Take 1 Tab by mouth daily.  metoprolol tartrate (LOPRESSOR) 50 mg tablet TAKE 1 TABLET TWICE A DAY    canagliflozin (INVOKANA) 100 mg tablet Take 100 mg by mouth Daily (before breakfast).  insulin glargine (LANTUS) 100 unit/mL injection by SubCUTAneous route nightly.  losartan (COZAAR) 100 mg tablet Take 1 Tab by mouth daily. (Patient taking differently: Take 50 mg by mouth daily.)    omega-3 fatty acids-vitamin e (FISH OIL) 1,000 mg cap Take 1 Cap by mouth.  metFORMIN (GLUCOPHAGE) 1,000 mg tablet Take 1,000 mg by mouth two (2) times daily (with meals).  cholecalciferol, vitamin D3, (VITAMIN D3) 2,000 unit Tab Take  by mouth daily. No current facility-administered medications for this visit. Visit Vitals    /65    Pulse 66    Ht 6' 1\" (1.854 m)    Wt 91.2 kg (201 lb)    BMI 26.52 kg/m2         Physical Exam   Constitutional: He is oriented to person, place, and time. He appears well-developed and well-nourished. HENT:   Head: Normocephalic and atraumatic. Eyes: Conjunctivae are normal.   Neck: Neck supple. No JVD present. No tracheal deviation present. No thyromegaly present. Cardiovascular: Normal rate, regular rhythm and normal heart sounds. Exam reveals no gallop and no friction rub. No murmur heard. Pulmonary/Chest: Breath sounds normal. No respiratory distress. He has no wheezes. He has no rales. He exhibits no tenderness. Abdominal: Soft. There is no tenderness. Musculoskeletal: He exhibits no edema. Neurological: He is alert and oriented to person, place, and time. Skin: Skin is warm and dry. Psychiatric: He has a normal mood and affect.      Mr. Savanna Pickett has a reminder for a \"due or due soon\" health maintenance. I have asked that he contact his primary care provider for follow-up on this health maintenance. CARDIOLOGY STUDIES 2011 3/27/2009   Myocardial Perfusion Scan Result - abn scan, small reversible ischemia in Basal part of inferior wall, EF 63%   Cardiac Cath with PCI Result - EF 60%; Triple Bypass    Echocardiogram - Complete Result MILD LVH,NORMAL EF,DD -   Some recent data might be hidden     Diagnosis: 2014  1. Positive EKG portion of exercise stress test with inferior and lateral ST depression. 2. Shortness of breath and reduced functional tolerance with exercise. 3. Nuclear imaging report to follow. NUCLEAR IMAGIN2014     Findings:   1. Post-stress imaging in short axis, horizontal and vertical long axis views reveals normal isotope uptake in all areas. 2. Resting images also show normal isotope uptake in all areas. 3. Gated images show normal left ventricular size, wall motion and systolic function. Ejection fraction is 75%. Diagnosis:   1. Normal scan. 2. No evidence of significant fixed or reversible defect suggesting ischemia or myocardial infarction noted from this nuclear study. I Have personally reviewed recent relevant labs available and discussed with patient  2017-lipid  2018-lipid,lft  Assessment       ICD-10-CM ICD-9-CM    1. Atherosclerosis of native coronary artery of native heart without angina pectoris I25.10 414.01    2. Essential hypertension, benign I10 401.1    3. Postsurgical aortocoronary bypass status Z95.1 V45.81    4. Hyperlipidemia, unspecified hyperlipidemia type E78.5 272.4    5. Type 2 diabetes mellitus without complication, with long-term current use of insulin ([facility]) E11.9 250.00     Z79.4 V58.67        There are no discontinued medications. No orders of the defined types were placed in this encounter. Follow-up Disposition:  Return in about 6 months (around 2019).

## 2018-08-16 NOTE — MR AVS SNAPSHOT
[address] [place] [address] 
 
 
 [address] [facility] 
[phone] Patient: Terrence Mckeon MRN: [id number] ZE:1/71/1123 Visit Information Date & Time Provider Department Dept. Phone Encounter #  
 8/16/2018  9:00 AM Jimmie Anderson MD [facility] Etienne benitez [phone] [id number] Follow-up Instructions Return in about 6 months (around 2/16/2019). Upcoming Health Maintenance Date Due Hepatitis C Screening 1950 HEMOGLOBIN A1C Q6M 1950 FOOT EXAM Q1 7/14/1960 MICROALBUMIN Q1 7/14/1960 EYE EXAM RETINAL OR DILATED Q1 7/14/1960 DTaP/Tdap/Td series (1 - Tdap) 7/14/1971 FOBT Q 1 YEAR AGE 50-75 7/14/2000 ZOSTER VACCINE AGE 60> 5/14/2010 GLAUCOMA SCREENING Q2Y 7/14/2015 Pneumococcal 65+ Low/Medium Risk (1 of 2 - PCV13) 7/14/2015 MEDICARE YEARLY EXAM 3/14/2018 Influenza Age 5 to Adult 8/1/2018 LIPID PANEL Q1 8/9/2019 Allergies as of 8/16/2018  Review Complete On: 8/16/2018 By: Jimmie Anderson MD  
 No Known Allergies Current Immunizations  Never Reviewed No immunizations on file. Not reviewed this visit You Were Diagnosed With   
  
 Codes Comments Atherosclerosis of native coronary artery of native heart without angina pectoris    -  Primary ICD-10-CM: I25.10 ICD-9-CM: 414.01 Essential hypertension, benign     ICD-10-CM: I10 
ICD-9-CM: 401.1 Postsurgical aortocoronary bypass status     ICD-10-CM: Z95.1 ICD-9-CM: V45.81 Hyperlipidemia, unspecified hyperlipidemia type     ICD-10-CM: E78.5 ICD-9-CM: 272.4 Type 2 diabetes mellitus without complication, with long-term current use of insulin (HCC)     ICD-10-CM: E11.9, Z79.4 ICD-9-CM: 250.00, V58.67 Vitals BP Pulse Height(growth percentile) Weight(growth percentile) BMI Smoking Status 117/65 66 6' 1\" (1.854 m) 201 lb (91.2 kg) 26.52 kg/m2 Former Smoker Vitals History BMI and BSA Data Body Mass Index Body Surface Area  
 26.52 kg/m 2 2.17 m 2 Preferred Pharmacy Pharmacy Name Phone Ric[facility], [facility] [phone] Your Updated Medication List  
  
   
This list is accurate as of 8/16/18  9:22 AM.  Always use your most recent med list. amLODIPine 5 mg tablet Commonly known as:  [place] Blade Take 1 Tab by mouth daily. clopidogrel 75 mg Tab Commonly known as:  PLAVIX TAKE 1 TABLET DAILY FISH OIL 1,000 mg Cap Generic drug:  omega-3 fatty acids-vitamin e Take 1 Cap by mouth. insulin glargine 100 unit/mL injection Commonly known as:  LANTUS  
by SubCUTAneous route nightly. INVOKANA 100 mg tablet Generic drug:  canagliflozin Take 100 mg by mouth Daily (before breakfast). losartan 100 mg tablet Commonly known as:  COZAAR Take 1 Tab by mouth daily. metFORMIN 1,000 mg tablet Commonly known as:  GLUCOPHAGE Take 1,000 mg by mouth two (2) times daily (with meals). metoprolol tartrate 50 mg tablet Commonly known as:  LOPRESSOR  
TAKE 1 TABLET TWICE A DAY  
  
 simvastatin 20 mg tablet Commonly known as:  ZOCOR  
TAKE 1 TABLET NIGHTLY  
  
 VITAMIN D3 2,000 unit Tab Generic drug:  cholecalciferol (vitamin D3) Take  by mouth daily. Follow-up Instructions Return in about 6 months (around 2/16/2019). Introducing [facility]! [facility] introduces [employer] patient portal. Now you can access parts of your medical record, email your doctor's office, and request medication refills online. 1. In your internet browser, go to [email or web address] 2. Click on the First Time User? Click Here link in the Sign In box. You will see the New Member Sign Up page. 3. Enter your [employer] Access Code exactly as it appears below. You will not need to use this code after youve completed the sign-up process.  If you do not sign up before the expiration date, you must request a new code. · [facility] Access Code: [email or web address] Expires: 11/14/2018  8:48 AM 
 
4. Enter the last four digits of your Social Security Number (xxxx) and Date of Birth (mm/dd/yyyy) as indicated and click Submit. You will be taken to the next sign-up page. 5. Create a [facility] ID. This will be your [facility] login ID and cannot be changed, so think of one that is secure and easy to remember. 6. Create a [facility] password. You can change your password at any time. 7. Enter your Password Reset Question and Answer. This can be used at a later time if you forget your password. 8. Enter your e-mail address. You will receive e-mail notification when new information is available in [address] 9. Click Sign Up. You can now view and download portions of your medical record. 10. Click the Download Summary menu link to download a portable copy of your medical information. If you have questions, please visit the Frequently Asked Questions section of the [facility] website. Remember, [facility] is NOT to be used for urgent needs. For medical emergencies, dial 911. Now available from your iPhone and Android! Please provide this summary of care documentation to your next provider. Your primary care clinician is listed as Hannah Araiza. If you have any questions after today's visit, please call [phone].

## 2018-08-16 NOTE — LETTER
Cody Jackson 1950 8/16/2018 Dear MD Christiano Parsons MD Prentiss Ades, MD 
 
I had the pleasure of evaluating  Mr. Lazara Nicholson in office today. Below are the relevant portions of my assessment and plan of care. ICD-10-CM ICD-9-CM 1. Atherosclerosis of native coronary artery of native heart without angina pectoris I25.10 414.01   
2. Essential hypertension, benign I10 401.1 3. Postsurgical aortocoronary bypass status Z95.1 V45.81   
4. Hyperlipidemia, unspecified hyperlipidemia type E78.5 272.4 5. Type 2 diabetes mellitus without complication, with long-term current use of insulin (HCC) E11.9 250.00   
 Z79.4 V58.67 Current Outpatient Prescriptions Medication Sig Dispense Refill  clopidogrel (PLAVIX) 75 mg tab TAKE 1 TABLET DAILY 90 Tab 2  
 simvastatin (ZOCOR) 20 mg tablet TAKE 1 TABLET NIGHTLY 90 Tab 2  
 amLODIPine (NORVASC) 5 mg tablet Take 1 Tab by mouth daily. 90 Tab 3  
 metoprolol tartrate (LOPRESSOR) 50 mg tablet TAKE 1 TABLET TWICE A  Tab 3  
 canagliflozin (INVOKANA) 100 mg tablet Take 100 mg by mouth Daily (before breakfast).  insulin glargine (LANTUS) 100 unit/mL injection by SubCUTAneous route nightly.  losartan (COZAAR) 100 mg tablet Take 1 Tab by mouth daily. (Patient taking differently: Take 50 mg by mouth daily.) 90 Tab 3  
 omega-3 fatty acids-vitamin e (FISH OIL) 1,000 mg cap Take 1 Cap by mouth.  metFORMIN (GLUCOPHAGE) 1,000 mg tablet Take 1,000 mg by mouth two (2) times daily (with meals).  cholecalciferol, vitamin D3, (VITAMIN D3) 2,000 unit Tab Take  by mouth daily. No orders of the defined types were placed in this encounter. If you have questions, please do not hesitate to call me. I look forward to following Mr. Lazara Nicholson along with you. Sincerely, Citlali Ravi MD

## 2018-08-16 NOTE — PROGRESS NOTES
1. Have you been to the ER, urgent care clinic since your last visit? Hospitalized since your last visit? No    2. Have you seen or consulted any other health care providers outside of the [address] [place] [address] since your last visit? Include any pap smears or colon screening. Yes Where: Dr Calderón/PCP     3. Since your last visit, have you had any of the following symptoms? .           4. Have you had any blood work, X-rays or cardiac testing? 5. Where do you normally have your labs drawn? 6. Do you need any refills today?

## 2019-01-06 DIAGNOSIS — I10 ESSENTIAL HYPERTENSION, BENIGN: ICD-10-CM

## 2019-01-06 RX ORDER — METOPROLOL TARTRATE 50 MG/1
TABLET ORAL
Qty: 180 TAB | Refills: 3 | Status: SHIPPED | OUTPATIENT
Start: 2019-01-06 | End: 2020-02-14 | Stop reason: SDUPTHER

## 2019-01-07 RX ORDER — SIMVASTATIN 20 MG/1
TABLET, FILM COATED ORAL
Qty: 90 TAB | Refills: 2 | Status: SHIPPED | OUTPATIENT
Start: 2019-01-07 | End: 2019-09-19 | Stop reason: SDUPTHER

## 2019-02-26 ENCOUNTER — OFFICE VISIT (OUTPATIENT)
Dept: CARDIOLOGY CLINIC | Age: 69
End: 2019-02-26

## 2019-02-26 VITALS
BODY MASS INDEX: 28.36 KG/M2 | DIASTOLIC BLOOD PRESSURE: 74 MMHG | HEIGHT: 73 IN | HEART RATE: 65 BPM | SYSTOLIC BLOOD PRESSURE: 138 MMHG | WEIGHT: 214 LBS

## 2019-02-26 DIAGNOSIS — I25.10 ATHEROSCLEROSIS OF NATIVE CORONARY ARTERY OF NATIVE HEART WITHOUT ANGINA PECTORIS: Primary | ICD-10-CM

## 2019-02-26 DIAGNOSIS — Z95.1 POSTSURGICAL AORTOCORONARY BYPASS STATUS: ICD-10-CM

## 2019-02-26 DIAGNOSIS — E78.5 HYPERLIPIDEMIA, UNSPECIFIED HYPERLIPIDEMIA TYPE: ICD-10-CM

## 2019-02-26 DIAGNOSIS — I10 ESSENTIAL HYPERTENSION, BENIGN: ICD-10-CM

## 2019-02-26 NOTE — PROGRESS NOTES
HISTORY OF PRESENT ILLNESS  Yvrose Cartagena is a 76 y.o. male. Patient with cad,post cabg,htn,dm. On follow up patient denies any chest pains,sob, palpitation or other significant symptoms. Hypertension   The history is provided by the patient. This is a chronic problem. The problem occurs constantly. The problem has not changed since onset. Pertinent negatives include no chest pain and no shortness of breath. Cholesterol Problem   The history is provided by the patient. This is a chronic problem. The problem occurs constantly. The problem has not changed since onset. Pertinent negatives include no chest pain and no shortness of breath. Review of Systems   Constitutional: Negative for chills and fever. HENT: Negative for nosebleeds. Eyes: Negative for blurred vision and double vision. Respiratory: Negative for cough, hemoptysis, sputum production, shortness of breath and wheezing. Cardiovascular: Negative for chest pain, palpitations, orthopnea, claudication, leg swelling and PND. Gastrointestinal: Negative for heartburn, nausea and vomiting. Musculoskeletal: Negative for myalgias. Skin: Negative for rash. Neurological: Negative for dizziness and weakness. Endo/Heme/Allergies: Does not bruise/bleed easily. History reviewed. No pertinent family history.     Past Medical History:   Diagnosis Date    Coronary atherosclerosis of native coronary artery     Stable, now post CABG    Essential hypertension, benign     stable    Other and unspecified hyperlipidemia     LDL less than 100, HDL 37, LFT normal    Postsurgical aortocoronary bypass status     Type II or unspecified type diabetes mellitus without mention of complication, not stated as uncontrolled 11/15/2013       Past Surgical History:   Procedure Laterality Date    HX CORONARY ARTERY BYPASS GRAFT         Social History     Tobacco Use    Smoking status: Former Smoker     Packs/day: 0.25     Last attempt to quit: 2005     Years since quittin.1    Smokeless tobacco: Never Used   Substance Use Topics    Alcohol use: No       No Known Allergies    Current Outpatient Medications   Medication Sig    simvastatin (ZOCOR) 20 mg tablet TAKE 1 TABLET NIGHTLY    metoprolol tartrate (LOPRESSOR) 50 mg tablet TAKE 1 TABLET TWICE A DAY    clopidogrel (PLAVIX) 75 mg tab TAKE 1 TABLET DAILY    amLODIPine (NORVASC) 5 mg tablet Take 1 Tab by mouth daily.  canagliflozin (INVOKANA) 100 mg tablet Take 100 mg by mouth Daily (before breakfast).  insulin glargine (LANTUS) 100 unit/mL injection by SubCUTAneous route nightly.  losartan (COZAAR) 100 mg tablet Take 1 Tab by mouth daily. (Patient taking differently: Take 50 mg by mouth daily.)    omega-3 fatty acids-vitamin e (FISH OIL) 1,000 mg cap Take 1 Cap by mouth.  metFORMIN (GLUCOPHAGE) 1,000 mg tablet Take 1,000 mg by mouth two (2) times daily (with meals).  cholecalciferol, vitamin D3, (VITAMIN D3) 2,000 unit Tab Take  by mouth daily. No current facility-administered medications for this visit. Visit Vitals  /74   Pulse 65   Ht 6' 1\" (1.854 m)   Wt 97.1 kg (214 lb)   BMI 28.23 kg/m²         Physical Exam   Constitutional: He is oriented to person, place, and time. He appears well-developed and well-nourished. HENT:   Head: Normocephalic and atraumatic. Eyes: Conjunctivae are normal.   Neck: Neck supple. No JVD present. No tracheal deviation present. No thyromegaly present. Cardiovascular: Normal rate, regular rhythm and normal heart sounds. Exam reveals no gallop and no friction rub. No murmur heard. Pulmonary/Chest: Breath sounds normal. No respiratory distress. He has no wheezes. He has no rales. He exhibits no tenderness. Abdominal: Soft. There is no tenderness. Musculoskeletal: He exhibits no edema. Neurological: He is alert and oriented to person, place, and time. Skin: Skin is warm and dry.    Psychiatric: He has a normal mood and affect. Mr. Traci Kelley has a reminder for a \"due or due soon\" health maintenance. I have asked that he contact his primary care provider for follow-up on this health maintenance. No flowsheet data found. Diagnosis: 2014  1. Positive EKG portion of exercise stress test with inferior and lateral ST depression. 2. Shortness of breath and reduced functional tolerance with exercise. 3. Nuclear imaging report to follow. NUCLEAR IMAGIN2014     Findings:   1. Post-stress imaging in short axis, horizontal and vertical long axis views reveals normal isotope uptake in all areas. 2. Resting images also show normal isotope uptake in all areas. 3. Gated images show normal left ventricular size, wall motion and systolic function. Ejection fraction is 75%. Diagnosis:   1. Normal scan. 2. No evidence of significant fixed or reversible defect suggesting ischemia or myocardial infarction noted from this nuclear study. I Have personally reviewed recent relevant labs available and discussed with patient  2017-lipid  2018-lipid,lft  Assessment       ICD-10-CM ICD-9-CM    1. Atherosclerosis of native coronary artery of native heart without angina pectoris I25.10 414.01     Stable cardiac syndrome continue treatment   2. Essential hypertension, benign I10 401.1     Controlled continue with salt restriction and treatment   3. Postsurgical aortocoronary bypass status Z95.1 V45.81     Stable   4. Hyperlipidemia, unspecified hyperlipidemia type E78.5 272.4     Controlled on statin       There are no discontinued medications. No orders of the defined types were placed in this encounter. Follow-up Disposition:  Return in about 6 months (around 2019).

## 2019-02-26 NOTE — PATIENT INSTRUCTIONS
Worklight Activation    Thank you for requesting access to Worklight. Please follow the instructions below to securely access and download your online medical record. Worklight allows you to send messages to your doctor, view your test results, renew your prescriptions, schedule appointments, and more. How Do I Sign Up? 1. In your internet browser, go to [email or web address]. 2. Click on the First Time User? Click Here link in the Sign In box. You will see the New Member Sign Up page. 3. Enter your Worklight Access Code exactly as it appears below. You will not need to use this code after youve completed the sign-up process. If you do not sign up before the expiration date, you must request a new code. Worklight Access Code: [email or web address]  Expires: 2019 10:06 AM (This is the date your Worklight access code will )    4. Enter the last four digits of your Social Security Number (xxxx) and Date of Birth (mm/dd/yyyy) as indicated and click Submit. You will be taken to the next sign-up page. 5. Create a Worklight ID. This will be your Worklight login ID and cannot be changed, so think of one that is secure and easy to remember. 6. Create a Worklight password. You can change your password at any time. 7. Enter your Password Reset Question and Answer. This can be used at a later time if you forget your password. 8. Enter your e-mail address. You will receive e-mail notification when new information is available in [address] 9. Click Sign Up. You can now view and download portions of your medical record. 10. Click the Download Summary menu link to download a portable copy of your medical information. Additional Information    If you have questions, please visit the Frequently Asked Questions section of the Work[employer] website at [email or web address] Remember, [employer] is NOT to be used for urgent needs. For medical emergencies, dial 911.

## 2019-02-26 NOTE — PROGRESS NOTES
1. Have you been to the ER, urgent care clinic since your last visit? Hospitalized since your last visit? No     2. Have you seen or consulted any other health care providers outside of the [address] [place] [address] since your last visit? Include any pap smears or colon screening. Yes Where: PCP     3. Since your last visit, have you had any of the following symptoms? .         4. Have you had any blood work, X-rays or cardiac testing? No      5. Where do you normally have your labs drawn? 6. Do you need any refills today?

## 2019-02-26 NOTE — LETTER
Jose Alberto Amos 1950 2/26/2019 Dear MD Zara Lin MD Scherry Heard, MD 
 
I had the pleasure of evaluating  Mr. Quita Basilio in office today. Below are the relevant portions of my assessment and plan of care. ICD-10-CM ICD-9-CM 1. Atherosclerosis of native coronary artery of native heart without angina pectoris I25.10 414.01 Stable cardiac syndrome continue treatment 2. Essential hypertension, benign I10 401.1 Controlled continue with salt restriction and treatment 3. Postsurgical aortocoronary bypass status Z95.1 V45.81 Stable 4. Hyperlipidemia, unspecified hyperlipidemia type E78.5 272.4 Controlled on statin Current Outpatient Medications Medication Sig Dispense Refill  simvastatin (ZOCOR) 20 mg tablet TAKE 1 TABLET NIGHTLY 90 Tab 2  
 metoprolol tartrate (LOPRESSOR) 50 mg tablet TAKE 1 TABLET TWICE A  Tab 3  clopidogrel (PLAVIX) 75 mg tab TAKE 1 TABLET DAILY 90 Tab 2  
 amLODIPine (NORVASC) 5 mg tablet Take 1 Tab by mouth daily. 90 Tab 3  
 canagliflozin (INVOKANA) 100 mg tablet Take 100 mg by mouth Daily (before breakfast).  insulin glargine (LANTUS) 100 unit/mL injection by SubCUTAneous route nightly.  losartan (COZAAR) 100 mg tablet Take 1 Tab by mouth daily. (Patient taking differently: Take 50 mg by mouth daily.) 90 Tab 3  
 omega-3 fatty acids-vitamin e (FISH OIL) 1,000 mg cap Take 1 Cap by mouth.  metFORMIN (GLUCOPHAGE) 1,000 mg tablet Take 1,000 mg by mouth two (2) times daily (with meals).  cholecalciferol, vitamin D3, (VITAMIN D3) 2,000 unit Tab Take  by mouth daily. No orders of the defined types were placed in this encounter. If you have questions, please do not hesitate to call me. I look forward to following Mr. Quita Basilio along with you. Sincerely, Adan Kaufman MD

## 2019-03-08 RX ORDER — AMLODIPINE BESYLATE 5 MG/1
TABLET ORAL
Qty: 90 TAB | Refills: 3 | Status: SHIPPED | OUTPATIENT
Start: 2019-03-08 | End: 2020-02-06 | Stop reason: SDUPTHER

## 2019-03-29 RX ORDER — CLOPIDOGREL BISULFATE 75 MG/1
TABLET ORAL
Qty: 90 TAB | Refills: 2 | Status: SHIPPED | OUTPATIENT
Start: 2019-03-29 | End: 2019-12-30

## 2019-08-29 ENCOUNTER — OFFICE VISIT (OUTPATIENT)
Dept: CARDIOLOGY CLINIC | Age: 69
End: 2019-08-29

## 2019-08-29 VITALS
HEIGHT: 73 IN | DIASTOLIC BLOOD PRESSURE: 67 MMHG | BODY MASS INDEX: 27.7 KG/M2 | WEIGHT: 209 LBS | HEART RATE: 68 BPM | SYSTOLIC BLOOD PRESSURE: 128 MMHG

## 2019-08-29 DIAGNOSIS — Z95.1 POSTSURGICAL AORTOCORONARY BYPASS STATUS: ICD-10-CM

## 2019-08-29 DIAGNOSIS — I25.10 ATHEROSCLEROSIS OF NATIVE CORONARY ARTERY OF NATIVE HEART WITHOUT ANGINA PECTORIS: Primary | ICD-10-CM

## 2019-08-29 DIAGNOSIS — E78.5 HYPERLIPIDEMIA, UNSPECIFIED HYPERLIPIDEMIA TYPE: ICD-10-CM

## 2019-08-29 DIAGNOSIS — I10 ESSENTIAL HYPERTENSION, BENIGN: ICD-10-CM

## 2019-08-29 NOTE — PROGRESS NOTES
1. Have you been to the ER, urgent care clinic since your last visit? Hospitalized since your last visit? No    2. Have you seen or consulted any other health care providers outside of the [address] [place] [address] since your last visit? Include any pap smears or colon screening.  Yes Where: PCP

## 2019-08-29 NOTE — PROGRESS NOTES
HISTORY OF PRESENT ILLNESS  Geronimo Kirk is a 71 y.o. male. Patient with cad,post cabg,htn,dm. On follow up patient denies any chest pains,sob, palpitation or other significant symptoms. Hypertension   The history is provided by the patient. This is a chronic problem. The problem occurs constantly. The problem has not changed since onset. Pertinent negatives include no chest pain and no shortness of breath. Cholesterol Problem   The history is provided by the patient. This is a chronic problem. The problem occurs constantly. The problem has not changed since onset. Pertinent negatives include no chest pain and no shortness of breath. Review of Systems   Constitutional: Negative for chills and fever. HENT: Negative for nosebleeds. Eyes: Negative for blurred vision and double vision. Respiratory: Negative for cough, hemoptysis, sputum production, shortness of breath and wheezing. Cardiovascular: Negative for chest pain, palpitations, orthopnea, claudication, leg swelling and PND. Gastrointestinal: Negative for heartburn, nausea and vomiting. Musculoskeletal: Negative for myalgias. Skin: Negative for rash. Neurological: Negative for dizziness and weakness. Endo/Heme/Allergies: Does not bruise/bleed easily. No family history on file.     Past Medical History:   Diagnosis Date    Coronary atherosclerosis of native coronary artery     Stable, now post CABG    Essential hypertension, benign     stable    Other and unspecified hyperlipidemia     LDL less than 100, HDL 37, LFT normal    Postsurgical aortocoronary bypass status     Type II or unspecified type diabetes mellitus without mention of complication, not stated as uncontrolled 11/15/2013       Past Surgical History:   Procedure Laterality Date    HX CORONARY ARTERY BYPASS GRAFT         Social History     Tobacco Use    Smoking status: Former Smoker     Packs/day: 0.25     Last attempt to quit: 1/1/2005     Years since quittin.6    Smokeless tobacco: Never Used   Substance Use Topics    Alcohol use: No       No Known Allergies    Current Outpatient Medications   Medication Sig    levomefolate/B6/B12/algal oil (L-METHYLFOLATE CA P-5-P ME-CBL PO) Take  by mouth.  clopidogrel (PLAVIX) 75 mg tab TAKE 1 TABLET DAILY    amLODIPine (NORVASC) 5 mg tablet TAKE 1 TABLET DAILY    simvastatin (ZOCOR) 20 mg tablet TAKE 1 TABLET NIGHTLY    metoprolol tartrate (LOPRESSOR) 50 mg tablet TAKE 1 TABLET TWICE A DAY    canagliflozin (INVOKANA) 100 mg tablet Take 100 mg by mouth Daily (before breakfast).  insulin glargine (LANTUS) 100 unit/mL injection by SubCUTAneous route nightly.  losartan (COZAAR) 100 mg tablet Take 1 Tab by mouth daily. (Patient taking differently: Take 50 mg by mouth daily.)    omega-3 fatty acids-vitamin e (FISH OIL) 1,000 mg cap Take 1 Cap by mouth.  metFORMIN (GLUCOPHAGE) 1,000 mg tablet Take 1,000 mg by mouth two (2) times daily (with meals).  cholecalciferol, vitamin D3, (VITAMIN D3) 2,000 unit Tab Take  by mouth daily. No current facility-administered medications for this visit. Visit Vitals  /67   Pulse 68   Ht 6' 1\" (1.854 m)   Wt 94.8 kg (209 lb)   BMI 27.57 kg/m²         Physical Exam   Constitutional: He is oriented to person, place, and time. He appears well-developed and well-nourished. HENT:   Head: Normocephalic and atraumatic. Eyes: Conjunctivae are normal.   Neck: Neck supple. No JVD present. No tracheal deviation present. No thyromegaly present. Cardiovascular: Normal rate, regular rhythm and normal heart sounds. Exam reveals no gallop and no friction rub. No murmur heard. Pulmonary/Chest: Breath sounds normal. No respiratory distress. He has no wheezes. He has no rales. He exhibits no tenderness. Abdominal: Soft. There is no tenderness. Musculoskeletal: He exhibits no edema. Neurological: He is alert and oriented to person, place, and time.    Skin: Skin is warm and dry. Psychiatric: He has a normal mood and affect. Mr. Charu Peterson has a reminder for a \"due or due soon\" health maintenance. I have asked that he contact his primary care provider for follow-up on this health maintenance. No flowsheet data found. Diagnosis: 2014  1. Positive EKG portion of exercise stress test with inferior and lateral ST depression. 2. Shortness of breath and reduced functional tolerance with exercise. 3. Nuclear imaging report to follow. NUCLEAR IMAGIN2014     Findings:   1. Post-stress imaging in short axis, horizontal and vertical long axis views reveals normal isotope uptake in all areas. 2. Resting images also show normal isotope uptake in all areas. 3. Gated images show normal left ventricular size, wall motion and systolic function. Ejection fraction is 75%. Diagnosis:   1. Normal scan. 2. No evidence of significant fixed or reversible defect suggesting ischemia or myocardial infarction noted from this nuclear study. I Have personally reviewed recent relevant labs available and discussed with patient  2017-lipid  2018-lipid,lft  2019-lipid  Assessment       ICD-10-CM ICD-9-CM    1. Atherosclerosis of native coronary artery of native heart without angina pectoris I25.10 414.01     Clinically stable continue current medical management   2. Essential hypertension, benign I10 401.1     Blood pressure controlled continue therapy   3. Postsurgical aortocoronary bypass status Z95.1 V45.81     Stable   4. Hyperlipidemia, unspecified hyperlipidemia type E78.5 272.4     LDL in 60s. Last left eye 2019 continue therapy       There are no discontinued medications. No orders of the defined types were placed in this encounter. Follow-up and Dispositions    · Return in about 6 months (around 2020).

## 2019-08-29 NOTE — PATIENT INSTRUCTIONS
[employer] Activation    Thank you for requesting access to [employer]. Please follow the instructions below to securely access and download your online medical record. [employer] allows you to send messages to your doctor, view your test results, renew your prescriptions, schedule appointments, and more. How Do I Sign Up? 1. In your internet browser, go to [email or web address]. 2. Click on the First Time User? Click Here link in the Sign In box. You will see the New Member Sign Up page. 3. Enter your [employer] Access Code exactly as it appears below. You will not need to use this code after youve completed the sign-up process. If you do not sign up before the expiration date, you must request a new code. [employer] Access Code: [email or web address]  Expires: 10/13/2019 10:41 AM (This is the date your [employer] access code will )    4. Enter the last four digits of your Social Security Number (xxxx) and Date of Birth (mm/dd/yyyy) as indicated and click Submit. You will be taken to the next sign-up page. 5. Create a Graph Story ID. This will be your Graph Story login ID and cannot be changed, so think of one that is secure and easy to remember. 6. Create a [employer] password. You can change your password at any time. 7. Enter your Password Reset Question and Answer. This can be used at a later time if you forget your password. 8. Enter your e-mail address. You will receive e-mail notification when new information is available in [address]. 9. Click Sign Up. You can now view and download portions of your medical record. 10. Click the Download Summary menu link to download a portable copy of your medical information. Additional Information    If you have questions, please visit the Frequently Asked Questions section of the Graph [employer] website at [email or web address] Remember, [employer] is NOT to be used for urgent needs. For medical emergencies, dial 911.

## 2019-09-19 RX ORDER — SIMVASTATIN 20 MG/1
TABLET, FILM COATED ORAL
Qty: 90 TAB | Refills: 4 | Status: SHIPPED | OUTPATIENT
Start: 2019-09-19 | End: 2020-10-21 | Stop reason: SDUPTHER

## 2020-02-06 DIAGNOSIS — I10 ESSENTIAL HYPERTENSION, BENIGN: Primary | ICD-10-CM

## 2020-02-06 RX ORDER — AMLODIPINE BESYLATE 5 MG/1
5 TABLET ORAL DAILY
Qty: 90 TAB | Refills: 3 | Status: SHIPPED | OUTPATIENT
Start: 2020-02-06 | End: 2020-12-30 | Stop reason: SDUPTHER

## 2020-02-14 ENCOUNTER — OFFICE VISIT (OUTPATIENT)
Dept: CARDIOLOGY CLINIC | Age: 70
End: 2020-02-14

## 2020-02-14 VITALS
HEIGHT: 73 IN | HEART RATE: 66 BPM | WEIGHT: 216 LBS | BODY MASS INDEX: 28.63 KG/M2 | SYSTOLIC BLOOD PRESSURE: 131 MMHG | DIASTOLIC BLOOD PRESSURE: 71 MMHG

## 2020-02-14 DIAGNOSIS — I10 ESSENTIAL HYPERTENSION, BENIGN: ICD-10-CM

## 2020-02-14 DIAGNOSIS — E78.5 HYPERLIPIDEMIA, UNSPECIFIED HYPERLIPIDEMIA TYPE: ICD-10-CM

## 2020-02-14 DIAGNOSIS — I25.10 ATHEROSCLEROSIS OF NATIVE CORONARY ARTERY OF NATIVE HEART WITHOUT ANGINA PECTORIS: Primary | ICD-10-CM

## 2020-02-14 DIAGNOSIS — Z95.1 POSTSURGICAL AORTOCORONARY BYPASS STATUS: ICD-10-CM

## 2020-02-14 RX ORDER — METOPROLOL TARTRATE 50 MG/1
TABLET ORAL
Qty: 180 TAB | Refills: 3 | Status: SHIPPED | OUTPATIENT
Start: 2020-02-14 | End: 2020-12-29

## 2020-02-14 NOTE — PATIENT INSTRUCTIONS
[employer] Activation    Thank you for requesting access to [employer]. Please follow the instructions below to securely access and download your online medical record. [employer] allows you to send messages to your doctor, view your test results, renew your prescriptions, schedule appointments, and more. How Do I Sign Up? 1. In your internet browser, go to [email or web address])/Front Desk HQhart. 2. Click on the First Time User? Click Here link in the Sign In box. You will see the New Member Sign Up page. 3. Enter your [employer] Access Code exactly as it appears below. You will not need to use this code after youve completed the sign-up process. If you do not sign up before the expiration date, you must request a new code. [employer] Access Code: [email or web address]  Expires: 3/30/2020  8:57 AM (This is the date your [employer] access code will )    4. Enter the last four digits of your Social Security Number (xxxx) and Date of Birth (mm/dd/yyyy) as indicated and click Submit. You will be taken to the next sign-up page. 5. Create a [employer] ID. This will be your [employer] login ID and cannot be changed, so think of one that is secure and easy to remember. 6. Create a [employer] password. You can change your password at any time. 7. Enter your Password Reset Question and Answer. This can be used at a later time if you forget your password. 8. Enter your e-mail address. You will receive e-mail notification when new information is available in [address] 9. Click Sign Up. You can now view and download portions of your medical record. 10. Click the Download Summary menu link to download a portable copy of your medical information. Additional Information    If you have questions, please visit the Frequently Asked Questions section of the [employer] website at [email or web address])/Front Desk [email or web address]/. Remember, [employer] is NOT to be used for urgent needs. For medical emergencies, dial 911.

## 2020-02-14 NOTE — PROGRESS NOTES
1. Have you been to the ER, urgent care clinic since your last visit? Hospitalized since your last visit? No    2. Have you seen or consulted any other health care providers outside of the [address] [place] [address] since your last visit? Include any pap smears or colon screening.  Yes Where: PCP

## 2020-02-14 NOTE — PROGRESS NOTES
HISTORY OF PRESENT ILLNESS  Van Napier is a 71 y.o. male. Patient with cad,post cabg,htn,dm. On follow up patient denies any chest pains,sob, palpitation or other significant symptoms. Hypertension   The history is provided by the patient. This is a chronic problem. The problem occurs constantly. The problem has not changed since onset. Pertinent negatives include no chest pain and no shortness of breath. Cholesterol Problem   The history is provided by the patient. This is a chronic problem. The problem occurs constantly. The problem has not changed since onset. Pertinent negatives include no chest pain and no shortness of breath. Review of Systems   Constitutional: Negative for chills and fever. HENT: Negative for nosebleeds. Eyes: Negative for blurred vision and double vision. Respiratory: Negative for cough, hemoptysis, sputum production, shortness of breath and wheezing. Cardiovascular: Negative for chest pain, palpitations, orthopnea, claudication, leg swelling and PND. Gastrointestinal: Negative for heartburn, nausea and vomiting. Musculoskeletal: Negative for myalgias. Skin: Negative for rash. Neurological: Negative for dizziness and weakness. Endo/Heme/Allergies: Does not bruise/bleed easily. History reviewed. No pertinent family history.     Past Medical History:   Diagnosis Date    Coronary atherosclerosis of native coronary artery     Stable, now post CABG    Essential hypertension, benign     stable    Other and unspecified hyperlipidemia     LDL less than 100, HDL 37, LFT normal    Postsurgical aortocoronary bypass status     Type II or unspecified type diabetes mellitus without mention of complication, not stated as uncontrolled 11/15/2013       Past Surgical History:   Procedure Laterality Date    HX CORONARY ARTERY BYPASS GRAFT         Social History     Tobacco Use    Smoking status: Former Smoker     Packs/day: 0.25     Last attempt to quit: 1/1/2005     Years since quitting: 15.1    Smokeless tobacco: Never Used   Substance Use Topics    Alcohol use: No       No Known Allergies    Current Outpatient Medications   Medication Sig    metoprolol tartrate (LOPRESSOR) 50 mg tablet TAKE 1 TABLET TWICE A DAY    amLODIPine (NORVASC) 5 mg tablet Take 1 Tab by mouth daily.  clopidogrel (PLAVIX) 75 mg tab TAKE 1 TABLET DAILY    simvastatin (ZOCOR) 20 mg tablet TAKE 1 TABLET NIGHTLY    levomefolate/B6/B12/algal oil (L-METHYLFOLATE CA P-5-P ME-CBL PO) Take  by mouth.  canagliflozin (INVOKANA) 100 mg tablet Take 100 mg by mouth Daily (before breakfast).  insulin glargine (LANTUS) 100 unit/mL injection by SubCUTAneous route nightly.  losartan (COZAAR) 100 mg tablet Take 1 Tab by mouth daily. (Patient taking differently: Take 50 mg by mouth daily.)    omega-3 fatty acids-vitamin e (FISH OIL) 1,000 mg cap Take 1 Cap by mouth.  metFORMIN (GLUCOPHAGE) 1,000 mg tablet Take 1,000 mg by mouth two (2) times daily (with meals).  cholecalciferol, vitamin D3, (VITAMIN D3) 2,000 unit Tab Take  by mouth daily. No current facility-administered medications for this visit. Visit Vitals  /71   Pulse 66   Ht 6' 1\" (1.854 m)   Wt 98 kg (216 lb)   BMI 28.50 kg/m²         Physical Exam   Constitutional: He is oriented to person, place, and time. He appears well-developed and well-nourished. HENT:   Head: Normocephalic and atraumatic. Eyes: Conjunctivae are normal.   Neck: Neck supple. No JVD present. No tracheal deviation present. No thyromegaly present. Cardiovascular: Normal rate and regular rhythm. Exam reveals no gallop and no friction rub. Murmur heard. Early systolic murmur is present at the upper right sternal border. Pulmonary/Chest: Breath sounds normal. No respiratory distress. He has no wheezes. He has no rales. He exhibits no tenderness. Abdominal: Soft. There is no abdominal tenderness.    Musculoskeletal:         General: No edema. Neurological: He is alert and oriented to person, place, and time. Skin: Skin is warm and dry. Psychiatric: He has a normal mood and affect. Mr. Melissa Hernandez has a reminder for a \"due or due soon\" health maintenance. I have asked that he contact his primary care provider for follow-up on this health maintenance. No flowsheet data found. Diagnosis: 2014  1. Positive EKG portion of exercise stress test with inferior and lateral ST depression. 2. Shortness of breath and reduced functional tolerance with exercise. 3. Nuclear imaging report to follow. NUCLEAR IMAGIN2014     Findings:   1. Post-stress imaging in short axis, horizontal and vertical long axis views reveals normal isotope uptake in all areas. 2. Resting images also show normal isotope uptake in all areas. 3. Gated images show normal left ventricular size, wall motion and systolic function. Ejection fraction is 75%. Diagnosis:   1. Normal scan. 2. No evidence of significant fixed or reversible defect suggesting ischemia or myocardial infarction noted from this nuclear study. I Have personally reviewed recent relevant labs available and discussed with patient  2017-lipid  2018-lipid,lft  2019-lipid  Assessment       ICD-10-CM ICD-9-CM    1. Atherosclerosis of native coronary artery of native heart without angina pectoris I25.10 414.01     Stable continue current medical management   2. Postsurgical aortocoronary bypass status Z95.1 V45.81     Stable monitor   3. Essential hypertension, benign I10 401.1 metoprolol tartrate (LOPRESSOR) 50 mg tablet    Stable continue therapy   4. Hyperlipidemia, unspecified hyperlipidemia type E78.5 272.4     Continue treatment lab with PCP   5. Essential hypertension, benign I10 401.1 metoprolol tartrate (LOPRESSOR) 50 mg tablet    controlled   2020  Stable cardiac status continue current medical management.   Lab follow-up with PCP      Medications Discontinued During This Encounter   Medication Reason    metoprolol tartrate (LOPRESSOR) 50 mg tablet Reorder       Orders Placed This Encounter    metoprolol tartrate (LOPRESSOR) 50 mg tablet     Sig: TAKE 1 TABLET TWICE A DAY     Dispense:  180 Tab     Refill:  3       Follow-up and Dispositions    · Return in about 6 months (around 8/14/2020).

## 2020-08-13 ENCOUNTER — OFFICE VISIT (OUTPATIENT)
Dept: CARDIOLOGY CLINIC | Age: 70
End: 2020-08-13

## 2020-08-13 VITALS
SYSTOLIC BLOOD PRESSURE: 140 MMHG | WEIGHT: 207 LBS | HEIGHT: 73 IN | HEART RATE: 63 BPM | DIASTOLIC BLOOD PRESSURE: 79 MMHG | BODY MASS INDEX: 27.43 KG/M2 | TEMPERATURE: 98 F

## 2020-08-13 DIAGNOSIS — I10 ESSENTIAL HYPERTENSION, BENIGN: ICD-10-CM

## 2020-08-13 DIAGNOSIS — I25.10 ATHEROSCLEROSIS OF NATIVE CORONARY ARTERY OF NATIVE HEART WITHOUT ANGINA PECTORIS: Primary | ICD-10-CM

## 2020-08-13 DIAGNOSIS — E78.5 HYPERLIPIDEMIA, UNSPECIFIED HYPERLIPIDEMIA TYPE: ICD-10-CM

## 2020-08-13 DIAGNOSIS — Z95.1 POSTSURGICAL AORTOCORONARY BYPASS STATUS: ICD-10-CM

## 2020-08-13 NOTE — PROGRESS NOTES
1. Have you been to the ER, urgent care clinic since your last visit? Hospitalized since your last visit?     no    2. Have you seen or consulted any other health care providers outside of the [address] [place] [address] since your last visit? Include any pap smears or colon screening.       No

## 2020-08-13 NOTE — PATIENT INSTRUCTIONS
Heart-Healthy Diet: Care Instructions Your Care Instructions A heart-healthy diet has lots of vegetables, fruits, nuts, beans, and whole grains, and is low in salt. It limits foods that are high in saturated fat, such as meats, cheeses, and fried foods. It may be hard to change your diet, but even small changes can lower your risk of heart attack and heart disease. Follow-up care is a key part of your treatment and safety. Be sure to make and go to all appointments, and call your doctor if you are having problems. It's also a good idea to know your test results and keep a list of the medicines you take. How can you care for yourself at home? Watch your portions · Learn what a serving is. A \"serving\" and a \"portion\" are not always the same thing. Make sure that you are not eating larger portions than are recommended. For example, a serving of pasta is ½ cup. A serving size of meat is 2 to 3 ounces. A 3-ounce serving is about the size of a deck of cards. Measure serving sizes until you are good at [place]" them. Keep in mind that restaurants often serve portions that are 2 or 3 times the size of one serving. · To keep your energy level up and keep you from feeling hungry, eat often but in smaller portions. · Eat only the number of calories you need to stay at a healthy weight. If you need to lose weight, eat fewer calories than your body burns (through exercise and other physical activity). Eat more fruits and vegetables · Eat a variety of fruit and vegetables every day. Dark green, deep orange, red, or yellow fruits and vegetables are especially good for you. Examples include spinach, carrots, peaches, and berries. · Keep carrots, celery, and other veggies handy for snacks. Buy fruit that is in season and store it where you can see it so that you will be tempted to eat it. · Cook dishes that have a lot of veggies in them, such as stir-fries and soups. Limit saturated and trans fat · Read food labels, and try to avoid saturated and trans fats. They increase your risk of heart disease. · Use olive or canola oil when you cook. · Bake, broil, grill, or steam foods instead of frying them. · Choose lean meats instead of high-fat meats such as hot dogs and sausages. Cut off all visible fat when you prepare meat. · Eat fish, skinless poultry, and meat alternatives such as soy products instead of high-fat meats. Soy products, such as tofu, may be especially good for your heart. · Choose low-fat or fat-free milk and dairy products. Eat foods high in fiber · Eat a variety of grain products every day. Include whole-grain foods that have lots of fiber and nutrients. Examples of whole-grain foods include oats, whole wheat bread, and brown rice. · Buy whole-grain breads and cereals, instead of white bread or pastries. Limit salt and sodium · Limit how much salt and sodium you eat to help lower your blood pressure. · Taste food before you salt it. Add only a little salt when you think you need it. With time, your taste buds will adjust to less salt. · Eat fewer snack items, fast foods, and other high-salt, processed foods. Check food labels for the amount of sodium in packaged foods. · Choose low-sodium versions of canned goods (such as soups, vegetables, and beans). Limit sugar · Limit drinks and foods with added sugar. These include candy, desserts, and soda pop. Limit alcohol · Limit alcohol to no more than 2 drinks a day for men and 1 drink a day for women. Too much alcohol can cause health problems. When should you call for help? Watch closely for changes in your health, and be sure to contact your doctor if: 
· You would like help planning heart-healthy meals. Where can you learn more? Go to [email or web address]/ Enter V137 in the search box to learn more about \"Heart-Healthy Diet: Care Instructions. \" 
 Current as of: 2019               Content Version: 12.5 © 2009-6856 [employer]. Care instructions adapted under license by [employer] (which disclaims liability or warranty for this information). If you have questions about a medical condition or this instruction, always ask your healthcare professional. [address] any warranty or liability for your use of this information. [email or web address] Activation Thank you for requesting access to [employer]. Please follow the instructions below to securely access and download your online medical record. [employer] allows you to send messages to your doctor, view your test results, renew your prescriptions, schedule appointments, and more. How Do I Sign Up? 1. In your internet browser, go to [email or web address]. 2. Click on the First Time User? Click Here link in the Sign In box. You will see the New Member Sign Up page. 3. Enter your [employer] Access Code exactly as it appears below. You will not need to use this code after youve completed the sign-up process. If you do not sign up before the expiration date, you must request a new code. [employer] Access Code: [email or web address] Expires: 2020  8:50 AM (This is the date your [employer] access code will ) 4. Enter the last four digits of your Social Security Number (xxxx) and Date of Birth (mm/dd/yyyy) as indicated and click Submit. You will be taken to the next sign-up page. 5. Create a [employer] ID. This will be your [employer] login ID and cannot be changed, so think of one that is secure and easy to remember. 6. Create a [employer] password. You can change your password at any time. 7. Enter your Password Reset Question and Answer. This can be used at a later time if you forget your password. 8. Enter your e-mail address. You will receive e-mail notification when new information is available in [address] 9. Click Sign Up. You can now view and download portions of your medical record. 10. Click the Download Summary menu link to download a portable copy of your medical information. Additional Information If you have questions, please visit the Frequently Asked Questions section of the [employer] website at [email or web address] Remember, [employer] is NOT to be used for urgent needs. For medical emergencies, dial 911.

## 2020-08-13 NOTE — PROGRESS NOTES
HISTORY OF PRESENT ILLNESS  Little Kerns is a 79 y.o. male. Patient with cad,post cabg,htn,dm. On follow up patient denies any chest pains,sob, palpitation or other significant symptoms. Hypertension   The history is provided by the patient. This is a chronic problem. The problem occurs constantly. The problem has not changed since onset. Pertinent negatives include no chest pain and no shortness of breath. Cholesterol Problem   The history is provided by the patient. This is a chronic problem. The problem occurs constantly. The problem has not changed since onset. Pertinent negatives include no chest pain and no shortness of breath. Review of Systems   Constitutional: Negative for chills and fever. HENT: Negative for nosebleeds. Eyes: Negative for blurred vision and double vision. Respiratory: Negative for cough, hemoptysis, sputum production, shortness of breath and wheezing. Cardiovascular: Negative for chest pain, palpitations, orthopnea, claudication, leg swelling and PND. Gastrointestinal: Negative for heartburn, nausea and vomiting. Musculoskeletal: Negative for myalgias. Skin: Negative for rash. Neurological: Negative for dizziness and weakness. Endo/Heme/Allergies: Does not bruise/bleed easily. No family history on file.     Past Medical History:   Diagnosis Date    Coronary atherosclerosis of native coronary artery     Stable, now post CABG    Essential hypertension, benign     stable    Other and unspecified hyperlipidemia     LDL less than 100, HDL 37, LFT normal    Postsurgical aortocoronary bypass status     Type II or unspecified type diabetes mellitus without mention of complication, not stated as uncontrolled 11/15/2013       Past Surgical History:   Procedure Laterality Date    HX CORONARY ARTERY BYPASS GRAFT         Social History     Tobacco Use    Smoking status: Former Smoker     Packs/day: 0.25     Last attempt to quit: 1/1/2005     Years since quitting: 15.6    Smokeless tobacco: Never Used   Substance Use Topics    Alcohol use: No       No Known Allergies    Current Outpatient Medications   Medication Sig    metoprolol tartrate (LOPRESSOR) 50 mg tablet TAKE 1 TABLET TWICE A DAY    amLODIPine (NORVASC) 5 mg tablet Take 1 Tab by mouth daily.  clopidogrel (PLAVIX) 75 mg tab TAKE 1 TABLET DAILY    simvastatin (ZOCOR) 20 mg tablet TAKE 1 TABLET NIGHTLY    levomefolate/B6/B12/algal oil (L-METHYLFOLATE CA P-5-P ME-CBL PO) Take  by mouth.  canagliflozin (INVOKANA) 100 mg tablet Take 100 mg by mouth Daily (before breakfast).  insulin glargine (LANTUS) 100 unit/mL injection by SubCUTAneous route nightly.  losartan (COZAAR) 100 mg tablet Take 1 Tab by mouth daily. (Patient taking differently: Take 50 mg by mouth daily.)    omega-3 fatty acids-vitamin e (FISH OIL) 1,000 mg cap Take 1 Cap by mouth.  metFORMIN (GLUCOPHAGE) 1,000 mg tablet Take 1,000 mg by mouth two (2) times daily (with meals).  cholecalciferol, vitamin D3, (VITAMIN D3) 2,000 unit Tab Take  by mouth daily. No current facility-administered medications for this visit. Visit Vitals  /79   Pulse 63   Temp 98 °F (36.7 °C)   Ht 6' 1\" (1.854 m)   Wt 93.9 kg (207 lb)   BMI 27.31 kg/m²         Physical Exam   Constitutional: He is oriented to person, place, and time. He appears well-developed and well-nourished. HENT:   Head: Normocephalic and atraumatic. Eyes: Conjunctivae are normal.   Neck: Neck supple. No JVD present. No tracheal deviation present. No thyromegaly present. Cardiovascular: Normal rate and regular rhythm. Exam reveals no gallop and no friction rub. Murmur heard. Early systolic murmur is present at the upper right sternal border. Pulmonary/Chest: Breath sounds normal. No respiratory distress. He has no wheezes. He has no rales. He exhibits no tenderness. Abdominal: Soft. There is no abdominal tenderness.    Musculoskeletal:         General: No edema. Neurological: He is alert and oriented to person, place, and time. Skin: Skin is warm and dry. Psychiatric: He has a normal mood and affect. Mr. Hugo Brown has a reminder for a \"due or due soon\" health maintenance. I have asked that he contact his primary care provider for follow-up on this health maintenance. No flowsheet data found. Diagnosis: 2014  1. Positive EKG portion of exercise stress test with inferior and lateral ST depression. 2. Shortness of breath and reduced functional tolerance with exercise. 3. Nuclear imaging report to follow. NUCLEAR [id number]     Findings:   1. Post-stress imaging in short axis, horizontal and vertical long axis views reveals normal isotope uptake in all areas. 2. Resting images also show normal isotope uptake in all areas. 3. Gated images show normal left ventricular size, wall motion and systolic function. Ejection fraction is 75%. Diagnosis:   1. Normal scan. 2. No evidence of significant fixed or reversible defect suggesting ischemia or myocardial infarction noted from this nuclear study. I Have personally reviewed recent relevant labs available and discussed with patient  2017-lipid  2018-lipid,lft  2019-lipid  2020 - lipids - LDL 67  Assessment       ICD-10-CM ICD-9-CM    1. Atherosclerosis of native coronary artery of native heart without angina pectoris  I25.10 414.01     Stable, continue current medications   2. Essential hypertension, benign  I10 401.1     B/P controlled with current medications   3. Postsurgical aortocoronary bypass status  Z95.1 V45.81     Stable, monitor   4. Hyperlipidemia, unspecified hyperlipidemia type  E78.5 272.4     Continue treatment lab with PCP   2020  Stable cardiac status continue current medical management. Lab follow-up with PCP  2020  Cardiac status stable. Recent labs reviewed, Lipids controlled. There are no discontinued medications.     No orders of the defined types were placed in this encounter. Follow-up and Dispositions    · Return in about 6 months (around 2/13/2021), or if symptoms worsen or fail to improve.

## 2020-10-21 DIAGNOSIS — E78.5 HYPERLIPIDEMIA, UNSPECIFIED HYPERLIPIDEMIA TYPE: Primary | ICD-10-CM

## 2020-10-21 RX ORDER — SIMVASTATIN 20 MG/1
20 TABLET, FILM COATED ORAL
Qty: 90 TAB | Refills: 4 | Status: SHIPPED | OUTPATIENT
Start: 2020-10-21 | End: 2021-04-30 | Stop reason: SDUPTHER

## 2020-10-21 NOTE — TELEPHONE ENCOUNTER
Requested Prescriptions     Pending Prescriptions Disp Refills    simvastatin (ZOCOR) 20 mg tablet 90 Tab 4     Sig: Take 1 Tab by mouth nightly.

## 2020-10-26 ENCOUNTER — HOSPITAL ENCOUNTER (OUTPATIENT)
Dept: LAB | Age: 70
Discharge: HOME | End: 2020-10-26
Payer: MEDICARE

## 2020-10-26 PROCEDURE — 87635 SARS-COV-2 COVID-19 AMP PRB: CPT

## 2020-10-28 LAB — SARS-COV-2, COV2NT: NOT DETECTED

## 2020-10-29 ENCOUNTER — ANESTHESIA EVENT (OUTPATIENT)
Dept: ENDOSCOPY | Age: 70
End: 2020-10-29
Payer: MEDICARE

## 2020-10-30 ENCOUNTER — ANESTHESIA (OUTPATIENT)
Dept: ENDOSCOPY | Age: 70
End: 2020-10-30
Payer: MEDICARE

## 2020-10-30 ENCOUNTER — HOSPITAL ENCOUNTER (OUTPATIENT)
Age: 70
Setting detail: OUTPATIENT SURGERY
Discharge: HOME | End: 2020-10-30
Attending: INTERNAL MEDICINE | Admitting: INTERNAL MEDICINE
Payer: MEDICARE

## 2020-10-30 VITALS
BODY MASS INDEX: 27.83 KG/M2 | TEMPERATURE: 96.7 F | WEIGHT: 210 LBS | DIASTOLIC BLOOD PRESSURE: 52 MMHG | SYSTOLIC BLOOD PRESSURE: 107 MMHG | OXYGEN SATURATION: 98 % | HEIGHT: 73 IN | HEART RATE: 52 BPM | RESPIRATION RATE: 14 BRPM

## 2020-10-30 LAB
GLUCOSE BLD STRIP.AUTO-MCNC: 102 MG/DL (ref 70–110)
GLUCOSE BLD STRIP.AUTO-MCNC: 99 MG/DL (ref 70–110)

## 2020-10-30 PROCEDURE — 88305 TISSUE EXAM BY PATHOLOGIST: CPT

## 2020-10-30 PROCEDURE — 00812 ANES LWR INTST SCR COLSC: CPT | Performed by: ANESTHESIOLOGY

## 2020-10-30 PROCEDURE — 74011250637 HC RX REV CODE- 250/637: Performed by: NURSE ANESTHETIST, CERTIFIED REGISTERED

## 2020-10-30 PROCEDURE — 76060000032 HC ANESTHESIA 0.5 TO 1 HR: Performed by: INTERNAL MEDICINE

## 2020-10-30 PROCEDURE — 77030013992 HC SNR POLYP ENDOSC BSC -B: Performed by: INTERNAL MEDICINE

## 2020-10-30 PROCEDURE — 82962 GLUCOSE BLOOD TEST: CPT

## 2020-10-30 PROCEDURE — 77030021593 HC FCPS BIOP ENDOSC BSC -A: Performed by: INTERNAL MEDICINE

## 2020-10-30 PROCEDURE — 77030038604 HC SNR ENDO EXACTO USEN -B: Performed by: INTERNAL MEDICINE

## 2020-10-30 PROCEDURE — 2709999900 HC NON-CHARGEABLE SUPPLY: Performed by: INTERNAL MEDICINE

## 2020-10-30 PROCEDURE — 74011250637 HC RX REV CODE- 250/637: Performed by: INTERNAL MEDICINE

## 2020-10-30 PROCEDURE — 76040000007: Performed by: INTERNAL MEDICINE

## 2020-10-30 PROCEDURE — 74011250636 HC RX REV CODE- 250/636: Performed by: NURSE ANESTHETIST, CERTIFIED REGISTERED

## 2020-10-30 PROCEDURE — 77030008565 HC TBNG SUC IRR ERBE -B: Performed by: INTERNAL MEDICINE

## 2020-10-30 PROCEDURE — 74011250636 HC RX REV CODE- 250/636: Performed by: ANESTHESIOLOGY

## 2020-10-30 RX ORDER — DEXTROSE 50 % IN WATER (D50W) INTRAVENOUS SYRINGE
25-50 AS NEEDED
Status: CANCELLED | OUTPATIENT
Start: 2020-10-30

## 2020-10-30 RX ORDER — INSULIN LISPRO 100 [IU]/ML
INJECTION, SOLUTION INTRAVENOUS; SUBCUTANEOUS ONCE
Status: DISCONTINUED | OUTPATIENT
Start: 2020-10-30 | End: 2020-10-30 | Stop reason: HOSPADM

## 2020-10-30 RX ORDER — PROPOFOL 10 MG/ML
INJECTION, EMULSION INTRAVENOUS AS NEEDED
Status: DISCONTINUED | OUTPATIENT
Start: 2020-10-30 | End: 2020-10-30 | Stop reason: HOSPADM

## 2020-10-30 RX ORDER — MAGNESIUM SULFATE 100 %
4 CRYSTALS MISCELLANEOUS AS NEEDED
Status: CANCELLED | OUTPATIENT
Start: 2020-10-30

## 2020-10-30 RX ORDER — SODIUM CHLORIDE, SODIUM LACTATE, POTASSIUM CHLORIDE, CALCIUM CHLORIDE 600; 310; 30; 20 MG/100ML; MG/100ML; MG/100ML; MG/100ML
50 INJECTION, SOLUTION INTRAVENOUS CONTINUOUS
Status: DISCONTINUED | OUTPATIENT
Start: 2020-10-30 | End: 2020-10-30 | Stop reason: HOSPADM

## 2020-10-30 RX ORDER — FAMOTIDINE 20 MG/1
20 TABLET, FILM COATED ORAL ONCE
Status: COMPLETED | OUTPATIENT
Start: 2020-10-30 | End: 2020-10-30

## 2020-10-30 RX ORDER — SODIUM CHLORIDE 0.9 % (FLUSH) 0.9 %
5-40 SYRINGE (ML) INJECTION EVERY 8 HOURS
Status: CANCELLED | OUTPATIENT
Start: 2020-10-30

## 2020-10-30 RX ORDER — ONDANSETRON 2 MG/ML
4 INJECTION INTRAMUSCULAR; INTRAVENOUS ONCE
Status: CANCELLED | OUTPATIENT
Start: 2020-10-30 | End: 2020-10-30

## 2020-10-30 RX ORDER — SODIUM CHLORIDE 0.9 % (FLUSH) 0.9 %
5-40 SYRINGE (ML) INJECTION AS NEEDED
Status: CANCELLED | OUTPATIENT
Start: 2020-10-30

## 2020-10-30 RX ORDER — DEXTROMETHORPHAN/PSEUDOEPHED 2.5-7.5/.8
DROPS ORAL AS NEEDED
Status: DISCONTINUED | OUTPATIENT
Start: 2020-10-30 | End: 2020-10-30 | Stop reason: HOSPADM

## 2020-10-30 RX ADMIN — PROPOFOL 50 MG: 10 INJECTION, EMULSION INTRAVENOUS at 07:43

## 2020-10-30 RX ADMIN — PROPOFOL 100 MG: 10 INJECTION, EMULSION INTRAVENOUS at 07:34

## 2020-10-30 RX ADMIN — PROPOFOL 100 MG: 10 INJECTION, EMULSION INTRAVENOUS at 07:56

## 2020-10-30 RX ADMIN — FAMOTIDINE 20 MG: 20 TABLET, FILM COATED ORAL at 06:54

## 2020-10-30 RX ADMIN — SODIUM CHLORIDE, SODIUM LACTATE, POTASSIUM CHLORIDE, AND CALCIUM CHLORIDE 50 ML/HR: 600; 310; 30; 20 INJECTION, SOLUTION INTRAVENOUS at 06:54

## 2020-10-30 NOTE — PROGRESS NOTES
Resume plavix today. Amie Padron MD  [facility].  [email or web address]. [employer]  Phone: [phone]  Pager: [phone]  Cell: [phone]. [email or web address]

## 2020-10-30 NOTE — ANESTHESIA PREPROCEDURE EVALUATION
Relevant Problems   No relevant active problems       Anesthetic History   No history of anesthetic complications            Review of Systems / Medical History  Patient summary reviewed and pertinent labs reviewed    Pulmonary          Smoker      Comments: Quit smoking over 10 years ago   Neuro/Psych              Cardiovascular    Hypertension: well controlled          CAD, CABG and hyperlipidemia      Comments: Cleared by Dr Homer Duffy cardiology   GI/Hepatic/Renal           PUD     Endo/Other    Diabetes: type 2, using insulin    Obesity     Other Findings              Physical Exam    Airway  Mallampati: II  TM Distance: > 6 cm  Neck ROM: normal range of motion   Mouth opening: Normal     Cardiovascular    Rhythm: regular  Rate: normal         Dental  No notable dental hx       Pulmonary                Comments: Non labored Abdominal  GI exam deferred       Other Findings            Anesthetic Plan    ASA: 3  Anesthesia type: MAC            Anesthetic plan and risks discussed with: Patient

## 2020-10-30 NOTE — PROGRESS NOTES
[email or web address] [place] [address]  [place], [address]      Brief Procedure Note    Bard Linares  1950  [id number]    Date of Procedure: 10/30/2020    Preoperative diagnosis: Body mass index 27.0 - 27.9,  adult:  V85.23 - Z68.27  Elevated blood pressure:  796.2 - R03.0  Colon cancer Screening:  V76.51 - Z12.11    Postoperative diagnosis: cecal polypx1, ascending polyp x2, transverse polypx1, hemorroids, diverticulosis,     Type of Anesthesia: MAC (Monitored anesthesia care)    Description of findings: same as post op dx    Procedure: Procedure(s):  COLONOSCOPY with polypectomy    :  Dr. Raymond Puri MD    Assistant(s): Endoscopy RN-1: David Fischer RN; Kraig Oconnor  [place] Staff: Lio Otto RN    Devices/implants/grafts/tissues/prosthesis: None    EBL:None    Specimens:   ID Type Source Tests Collected by Time Destination   1 : cecal polyp Preservative Cecum  Brianne Manley MD 10/30/2020 0691 Pathology   2 : ascending polyp Preservative Colon, Ascending  Brianne Manley MD 10/30/2020 6346 Pathology   3 : transverse polyp Preservative Colon, Aleck Coffer  Brianne Manley MD 10/30/2020 5828 Pathology       Findings: See printed and scanned procedure note    Complications: None    Dr. Raymond Puri MD  10/30/2020  8:00 AM

## 2020-10-30 NOTE — DISCHARGE INSTRUCTIONS
DISCHARGE SUMMARY from Nurse  PATIENT INSTRUCTIONS:  After general anesthesia or intravenous sedation, for 24 hours or while taking prescription Narcotics:  · Limit your activities  · Do not drive and operate hazardous machinery  · Do not make important personal or business decisions  · Do  not drink alcoholic beverages  · If you have not urinated within 8 hours after discharge, please contact your surgeon on call. Report the following to your surgeon:  · Excessive pain, swelling, redness or odor of or around the surgical area  · Temperature over 100.5  · Nausea and vomiting lasting longer than 4 hours or if unable to take medications  · Any signs of decreased circulation or nerve impairment to extremity: change in color, persistent  numbness, tingling, coldness or increase pain  · Any questions    What to do at Home:  Recommended activity: Activity as tolerated and no driving for today. *  Please give a list of your current medications to your Primary Care Provider. *  Please update this list whenever your medications are discontinued, doses are      changed, or new medications (including over-the-counter products) are added. *  Please carry medication information at all times in case of emergency situations. These are general instructions for a healthy lifestyle:    No smoking/ No tobacco products/ Avoid exposure to second hand smoke  Surgeon General's Warning:  Quitting smoking now greatly reduces serious risk to your health. Obesity, smoking, and sedentary lifestyle greatly increases your risk for illness    A healthy diet, regular physical exercise & weight monitoring are important for maintaining a healthy lifestyle    You may be retaining fluid if you have a history of heart failure or if you experience any of the following symptoms:  Weight gain of 3 pounds or more overnight or 5 pounds in a week, increased swelling in our hands or feet or shortness of breath while lying flat in bed.   Please call your doctor as soon as you notice any of these symptoms; do not wait until your next office visit. The discharge information has been reviewed with the patient. The patient verbalized understanding. Discharge medications reviewed with the patient and appropriate educational materials and side effects teaching were provided. ___________________________________________________________________________________________________________________________________    Patient Education   Colonoscopy: What to Expect at Home  Your Recovery  After a colonoscopy, you'll stay at the clinic for 1 to 2 hours until the medicines wear off. Then you can go home. But you'll need to arrange for a ride. Your doctor will tell you when you can eat and do your other usual activities. Your doctor will talk to you about when you'll need your next colonoscopy. Your doctor can help you decide how often you need to be checked. This will depend on the results of your test and your risk for colorectal cancer. After the test, you may be bloated or have gas pains. You may need to pass gas. If a biopsy was done or a polyp was removed, you may have streaks of blood in your stool (feces) for a few days. Problems such as heavy rectal bleeding may not occur until several weeks after the test. This isn't common. But it can happen after polyps are removed. This care sheet gives you a general idea about how long it will take for you to recover. But each person recovers at a different pace. Follow the steps below to get better as quickly as possible. How can you care for yourself at home? Activity    · Rest when you feel tired.     · You can do your normal activities when it feels okay to do so. Diet    · Follow your doctor's directions for eating.     · Unless your doctor has told you not to, drink plenty of fluids. This helps to replace the fluids that were lost during the colon prep.     · Do not drink alcohol.    Medicines    · Your doctor will tell you if and when you can restart your medicines. He or she will also give you instructions about taking any new medicines.     · If you take aspirin or some other blood thinner, ask your doctor if and when to start taking it again. Make sure that you understand exactly what your doctor wants you to do.     · If polyps were removed or a biopsy was done during the test, your doctor may tell you not to take aspirin or other anti-inflammatory medicines for a few days. These include ibuprofen (Advil, Motrin) and naproxen (Aleve). Other instructions    · For your safety, do not drive or operate machinery until the medicine wears off and you can think clearly. Your doctor may tell you not to drive or operate machinery until the day after your test.     · Do not sign legal documents or make major decisions until the medicine wears off and you can think clearly. The anesthesia can make it hard for you to fully understand what you are agreeing to. Follow-up care is a key part of your treatment and safety. Be sure to make and go to all appointments, and call your doctor if you are having problems. It's also a good idea to know your test results and keep a list of the medicines you take. When should you call for help? Call 911 anytime you think you may need emergency care. For example, call if:    · You passed out (lost consciousness).     · You pass maroon or bloody stools.     · You have trouble breathing. Call your doctor now or seek immediate medical care if:    · You have pain that does not get better after you take pain medicine.     · You are sick to your stomach or cannot drink fluids.     · You have new or worse belly pain.     · You have blood in your stools.     · You have a fever.     · You cannot pass stools or gas. Watch closely for changes in your health, and be sure to contact your doctor if you have any problems. Where can you learn more?   Go to http://www.gray.com/  Enter E264 in the search box to learn more about \"Colonoscopy: What to Expect at Home. \"  Current as of: April 29, 2020               Content Version: 12.6  © 2006-2020 [employer]. Care instructions adapted under license by [employer] (which disclaims liability or warranty for this information). If you have questions about a medical condition or this instruction, always ask your healthcare professional. [place] [address] any warranty or liability for your use of this information. Patient Education   Colon Polyps: Care Instructions  Your Care Instructions     Colon polyps are growths in the colon or the rectum. The cause of most colon polyps is not known, and most people who get them do not have any problems. But a certain kind can turn into cancer. For this reason, regular testing for colon polyps is important for people as they get older. It is also important for anyone who has an increased risk for colon cancer. Polyps are usually found through routine colon cancer screening tests. Although most colon polyps are not cancerous, they are usually removed and then tested for cancer. Screening for colon cancer saves lives because the cancer can usually be cured if it is caught early. If you have a polyp that is the type that can turn into cancer, you may need more tests to examine your entire colon. The doctor will remove any other polyps that he or she finds, and you will be tested more often. Follow-up care is a key part of your treatment and safety. Be sure to make and go to all appointments, and call your doctor if you are having problems. It's also a good idea to know your test results and keep a list of the medicines you take. How can you care for yourself at home? Regular exams to look for colon polyps are the best way to prevent polyps from turning into colon cancer.  These can include stool tests, sigmoidoscopy, colonoscopy, and CT colonography. Talk with your doctor about a testing schedule that is right for you. To prevent polyps  There is no home treatment that can prevent colon polyps. But these steps may help lower your risk for cancer. · Stay active. Being active can help you get to and stay at a healthy weight. Try to exercise on most days of the week. Walking is a good choice. · Eat well. Choose a variety of vegetables, fruits, legumes (such as peas and beans), fish, poultry, and whole grains. · Do not smoke. If you need help quitting, talk to your doctor about stop-smoking programs and medicines. These can increase your chances of quitting for good. · If you drink alcohol, limit how much you drink. Limit alcohol to 2 drinks a day for men and 1 drink a day for women. When should you call for help? Call your doctor now or seek immediate medical care if:    · You have severe belly pain.     · Your stools are maroon or very bloody. Watch closely for changes in your health, and be sure to contact your doctor if:    · You have a fever.     · You have nausea or vomiting.     · You have a change in bowel habits (new constipation or diarrhea).     · Your symptoms get worse or are not improving as expected. Where can you learn more? Go to http://www.Fitwall.com/  Enter C571 in the search box to learn more about \"Colon Polyps: Care Instructions. \"  Current as of: April 29, 2020               Content Version: 12.6  © 7771-2890 [employer]. Care instructions adapted under license by [employer] (which disclaims liability or warranty for this information). If you have questions about a medical condition or this instruction, always ask your healthcare professional. [place] [address] any warranty or liability for your use of this information.        Patient Education   Learning About Diverticulosis and Diverticulitis  What are diverticulosis and diverticulitis? In diverticulosis and diverticulitis, pouches called diverticula form in the wall of the large intestine, or colon. · In diverticulosis, the pouches do not cause any pain or other symptoms. · In diverticulitis, the pouches get inflamed or infected and cause symptoms. Doctors aren't sure what causes these pouches in the colon. But they think that a low-fiber diet may play a role. Without fiber to add bulk to the stool, the colon has to work harder than normal to push the stool forward. The pressure from this may cause pouches to form in weak spots along the colon. Some people with diverticulosis get diverticulitis. But experts don't know why this happens. What are the symptoms? · In diverticulosis, most people don't have symptoms. But pouches sometimes bleed. · In diverticulitis, symptoms may last from a few hours to a week or more. They include:  ? Belly pain. This is usually in the lower left side. It is sometimes worse when you move. This is the most common symptom. ? Fever and chills. ? Bloating and gas. ? Diarrhea or constipation. ? Nausea and sometimes vomiting.  ? Not feeling like eating. How can you prevent diverticulitis? You may be able to lower your chance of getting diverticulitis. You can do this by taking steps to prevent constipation. · Eat fruits, vegetables, beans, and whole grains every day. These foods are high in fiber. · Drink plenty of fluids. (Drink enough so that your urine is light yellow or clear like water.) If you have kidney, heart, or liver disease and have to limit fluids, talk with your doctor before you increase the amount of fluids you drink. · Get at least 30 minutes of exercise on most days of the week. Walking is a good choice. You also may want to do other activities, such as running, swimming, cycling, or playing tennis or team sports. · Take a fiber supplement, such as Citrucel or Metamucil, every day if needed.  Read and follow all instructions on the label. · Schedule time each day for a bowel movement. Having a daily routine may help. Take your time and do not strain when having a bowel movement. Some people avoid nuts, seeds, berries, and popcorn. They believe that these foods might get trapped in the diverticula and cause pain. But there is no proof that these foods cause diverticulitis or make it worse. How are these problems treated? · The best way to treat diverticulosis is to avoid constipation. · Treatment for diverticulitis includes antibiotics. It often includes a change in your diet. You may need only liquids at first. Your doctor may suggest pain medicines for pain or belly cramps. In some cases, surgery may be needed. Follow-up care is a key part of your treatment and safety. Be sure to make and go to all appointments, and call your doctor if you are having problems. It's also a good idea to know your test results and keep a list of the medicines you take. Where can you learn more? Go to http://www.gray.com/  Enter R951 in the search box to learn more about \"Learning About Diverticulosis and Diverticulitis. \"  Current as of: April 15, 2020               Content Version: 12.6  © 2848-3438 [employer]. Care instructions adapted under license by [employer] (which disclaims liability or warranty for this information). If you have questions about a medical condition or this instruction, always ask your healthcare professional. [place] [address] any warranty or liability for your use of this information. Patient Education   Hemorrhoids: Care Instructions  Your Care Instructions     Hemorrhoids are enlarged veins that develop in the anal canal. Bleeding during bowel movements, itching, swelling, and rectal pain are the most common symptoms. They can be uncomfortable at times, but hemorrhoids rarely are a serious problem.   You can treat most hemorrhoids with simple changes to your diet and bowel habits. These changes include eating more fiber and not straining to pass stools. Most hemorrhoids do not need surgery or other treatment unless they are very large and painful or bleed a lot. Follow-up care is a key part of your treatment and safety. Be sure to make and go to all appointments, and call your doctor if you are having problems. It's also a good idea to know your test results and keep a list of the medicines you take. How can you care for yourself at home? · Sit in a few inches of warm water (sitz bath) 3 times a day and after bowel movements. The warm water helps with pain and itching. · Put ice on your anal area several times a day for 10 minutes at a time. Put a thin cloth between the ice and your skin. Follow this by placing a warm, wet towel on the area for another 10 to 20 minutes. · Take pain medicines exactly as directed. ? If the doctor gave you a prescription medicine for pain, take it as prescribed. ? If you are not taking a prescription pain medicine, ask your doctor if you can take an over-the-counter medicine. · Keep the anal area clean, but be gentle. Use water and a fragrance-free soap, such as [place], or use baby wipes or medicated pads, such as Tucks. · Wear cotton underwear and loose clothing to decrease moisture in the anal area. · Eat more fiber. Include foods such as whole-grain breads and cereals, raw vegetables, raw and dried fruits, and beans. · Drink plenty of fluids, enough so that your urine is light yellow or clear like water. If you have kidney, heart, or liver disease and have to limit fluids, talk with your doctor before you increase the amount of fluids you drink. · Use a stool softener that contains bran or psyllium. You can save money by buying bran or psyllium (available in bulk at most health food stores) and sprinkling it on foods or stirring it into fruit juice.  Or you can use a product such as Metamucil or Hydrocil. · Practice healthy bowel habits. ? Go to the bathroom as soon as you have the urge. ? Avoid straining to pass stools. Relax and give yourself time to let things happen naturally. ? Do not hold your breath while passing stools. ? Do not read while sitting on the toilet. Get off the toilet as soon as you have finished. · Take your medicines exactly as prescribed. Call your doctor if you think you are having a problem with your medicine. When should you call for help? Call 911 anytime you think you may need emergency care. For example, call if:    · You pass maroon or very bloody stools. Call your doctor now or seek immediate medical care if:    · You have increased pain.     · You have increased bleeding. Watch closely for changes in your health, and be sure to contact your doctor if:    · Your symptoms have not improved after 3 or 4 days. Where can you learn more? Go to http://www.hill.com/  Enter F228 in the search box to learn more about \"Hemorrhoids: Care Instructions. \"  Current as of: April 15, 2020               Content Version: 12.6  © 1722-0808 [employer]. Care instructions adapted under license by [employer] (which disclaims liability or warranty for this information). If you have questions about a medical condition or this instruction, always ask your healthcare professional. [address] any warranty or liability for your use of this information.

## 2020-10-30 NOTE — ANESTHESIA POSTPROCEDURE EVALUATION
Procedure(s):  COLONOSCOPY with polypectomy. MAC    Anesthesia Post Evaluation      Multimodal analgesia: multimodal analgesia used between 6 hours prior to anesthesia start to PACU discharge  Patient location during evaluation: PACU  Patient participation: complete - patient participated  Level of consciousness: awake  Pain management: adequate  Airway patency: patent  Anesthetic complications: no  Cardiovascular status: acceptable  Respiratory status: acceptable  Hydration status: acceptable  Post anesthesia nausea and vomiting:  none      INITIAL Post-op Vital signs:   Vitals Value Taken Time   /44 10/30/2020  8:15 AM   Temp 36.3 °C (97.4 °F) 10/30/2020  8:08 AM   Pulse 52 10/30/2020  8:22 AM   Resp 13 10/30/2020  8:22 AM   SpO2 100 % 10/30/2020  8:22 AM   Vitals shown include unvalidated device data.

## 2020-10-30 NOTE — H&P
[email or web address]  [phone]      Impression:   1. Average risk colon cancer screening exam      Plan:     1.  Colonoscopy      Addendum: All lab tests orders pertaining to the procedure have been ordered by the anesthesia personnel and results will be addressed by the anesthesia team    Chief Complaint: Average risk colon cancer screening exam.      HPI:  Laurel Tee is a 79 y.o. male who is being seen on consult for average risk colon cancer screening with colonoscopy    PMH:   Past Medical History:   Diagnosis Date    Coronary atherosclerosis of native coronary artery 2009    Stable, now post CABG    Essential hypertension, benign     stable    Other and unspecified hyperlipidemia     LDL less than 100, HDL 37, LFT normal    Postsurgical aortocoronary bypass status     PUD (peptic ulcer disease)     h/o bleeding duodenal ulcer in 2014    Type II or unspecified type diabetes mellitus without mention of complication, not stated as uncontrolled 11/15/2013       PSH:   Past Surgical History:   Procedure Laterality Date    HX COLONOSCOPY      HX CORONARY ARTERY BYPASS GRAFT  2009       Social HX:   Social History     Socioeconomic History    Marital status:      Spouse name: Not on file    Number of children: Not on file    Years of education: Not on file    Highest education level: Not on file   Occupational History    Not on file   Social Needs    Financial resource strain: Not on file    Food insecurity     Worry: Not on file     Inability: Not on file    Transportation needs     Medical: Not on file     Non-medical: Not on file   Tobacco Use    Smoking status: Former Smoker     Packs/day: 0.25     Last attempt to quit: 1/1/2005     Years since quitting: 15.8    Smokeless tobacco: Never Used   Substance and Sexual Activity    Alcohol use: No    Drug use: No    Sexual activity: Not on file   Lifestyle    Physical activity     Days per week: Not on file     Minutes per session: Not on file    Stress: Not on file   Relationships    Social connections     Talks on phone: Not on file     Gets together: Not on file     Attends Yazidism service: Not on file     Active member of club or organization: Not on file     Attends meetings of clubs or organizations: Not on file     Relationship status: Not on file    Intimate partner violence     Fear of current or ex partner: Not on file     Emotionally abused: Not on file     Physically abused: Not on file     Forced sexual activity: Not on file   Other Topics Concern    Not on file   Social History Narrative    Not on file       FHX:   History reviewed. No pertinent family history. Allergy:   No Known Allergies    Home Medications:     Medications Prior to Admission   Medication Sig    simvastatin (ZOCOR) 20 mg tablet Take 1 Tab by mouth nightly.  metoprolol tartrate (LOPRESSOR) 50 mg tablet TAKE 1 TABLET TWICE A DAY    amLODIPine (NORVASC) 5 mg tablet Take 1 Tab by mouth daily.  levomefolate/B6/B12/algal oil (L-METHYLFOLATE CA P-5-P ME-CBL PO) Take  by mouth.  canagliflozin (INVOKANA) 100 mg tablet Take 100 mg by mouth Daily (before breakfast).  insulin glargine (LANTUS) 100 unit/mL injection by SubCUTAneous route nightly.  losartan (COZAAR) 100 mg tablet Take 1 Tab by mouth daily. (Patient taking differently: Take 50 mg by mouth daily.)    omega-3 fatty acids-vitamin e (FISH OIL) 1,000 mg cap Take 1 Cap by mouth.  metFORMIN (GLUCOPHAGE) 1,000 mg tablet Take 1,000 mg by mouth two (2) times daily (with meals).  cholecalciferol, vitamin D3, (VITAMIN D3) 2,000 unit Tab Take  by mouth daily.  clopidogrel (PLAVIX) 75 mg tab TAKE 1 TABLET DAILY       Review of Systems:     Constitutional: No fevers, chills, weight loss, fatigue. Skin: No rashes, pruritis, jaundice, ulcerations, erythema. HENT: No headaches, nosebleeds, sinus pressure, rhinorrhea, sore throat.    Eyes: No visual changes, blurred vision, eye pain, photophobia, jaundice. Cardiovascular: No chest pain, heart palpitations. Respiratory: No cough, SOB, wheezing, chest discomfort, orthopnea. Gastrointestinal:    Genitourinary: No dysuria, bleeding, discharge, pyuria. Musculoskeletal: No weakness, arthralgias, wasting. Endo: No sweats. Heme: No bruising, easy bleeding. Allergies: As noted. Neurological: Cranial nerves intact. Alert and oriented. Gait not assessed. Psychiatric:  No anxiety, depression, hallucinations. Visit Vitals  /62   Pulse 84   Temp 97.6 °F (36.4 °C)   Resp 18   Ht 6' 1\" (1.854 m)   Wt 95.3 kg (210 lb)   SpO2 98%   BMI 27.71 kg/m²       Physical Assessment:     constitutional: appearance: well developed, well nourished, normal habitus, no deformities, in no acute distress. skin: inspection: no rashes, ulcers, icterus or other lesions; no clubbing or telangiectasias. palpation: no induration or subcutaneos nodules. eyes: inspection: normal conjunctivae and lids; no jaundice pupils: symmetrical, normoreactive to light, normal accommodation and size. ENMT: mouth: normal oral mucosa,lips and gums; good dentition. oropharynx: normal tongue, hard and soft palate; posterior pharynx without erithema, exudate or lesions. neck: thyroid: normal size, consistency and position; no masses or tenderness. respiratory: effort: normal chest excursion; no intercostal retraction or accessory muscle use. cardiovascular: abdominal aorta: normal size and position; no bruits. palpation: PMI of normal size and position; normal rhythm; no thrill or murmurs. abdominal: abdomen: normal consistency; no tenderness or masses. hernias: no hernias appreciated. liver: normal size and consistency. spleen: not palpable. rectal: hemoccult/guaiac: not performed. musculoskeletal: digits and nails: no clubbing, cyanosis, petechiae or other inflammatory conditions.  gait: normal gait and station head and neck: normal range of motion; no pain, crepitation or contracture. spine/ribs/pelvis: normal range of motion; no pain, deformity or contracture. lymphatic: axilae: not palpable. groin: not palpable. neck: within normal limits. other: not palpable. neurologic: cranial nerves: II-XII normal.   psychiatric: judgement/insight: within normal limits. memory: within normal limits for recent and remote events. mood and affect: no evidence of depression, anxiety or agitation. orientation: oriented to time, space and person. Basic Metabolic Profile   No results for input(s): NA, K, CL, CO2, BUN, GLU, CA, MG, PHOS in the last 72 hours. No lab exists for component: CREAT      CBC w/Diff    No results for input(s): WBC, RBC, HGB, HCT, MCV, MCH, MCHC, RDW, PLT, HGBEXT, HCTEXT, PLTEXT in the last 72 hours. No lab exists for component: MPV No results for input(s): GRANS, LYMPH, EOS, PRO, MYELO, METAS, BLAST in the last 72 hours. No lab exists for component: MONO, BASO     Hepatic Function   No results for input(s): ALB, TP, TBILI, AP, AML, LPSE in the last 72 hours. No lab exists for component: DBILI, GPT, SGOT       Izabelal MD Jolie, M.D. [facility] [address] 1947, [address] [facility]  [email or web address]. [facility]

## 2020-12-16 RX ORDER — CLOPIDOGREL BISULFATE 75 MG/1
75 TABLET ORAL DAILY
Qty: 90 TAB | Refills: 3 | Status: SHIPPED | OUTPATIENT
Start: 2020-12-16 | End: 2021-06-15 | Stop reason: SDUPTHER

## 2020-12-29 DIAGNOSIS — I10 ESSENTIAL HYPERTENSION, BENIGN: ICD-10-CM

## 2020-12-29 RX ORDER — METOPROLOL TARTRATE 50 MG/1
TABLET ORAL
Qty: 180 TAB | Refills: 3 | Status: SHIPPED | OUTPATIENT
Start: 2020-12-29 | End: 2021-04-30 | Stop reason: SDUPTHER

## 2020-12-30 DIAGNOSIS — I10 ESSENTIAL HYPERTENSION, BENIGN: ICD-10-CM

## 2020-12-30 RX ORDER — AMLODIPINE BESYLATE 5 MG/1
5 TABLET ORAL DAILY
Qty: 90 TAB | Refills: 3 | Status: SHIPPED | OUTPATIENT
Start: 2020-12-30 | End: 2021-06-15 | Stop reason: SDUPTHER

## 2021-02-02 ENCOUNTER — OFFICE VISIT (OUTPATIENT)
Dept: CARDIOLOGY CLINIC | Age: 71
End: 2021-02-02
Payer: MEDICARE

## 2021-02-02 VITALS
HEART RATE: 63 BPM | TEMPERATURE: 98.2 F | BODY MASS INDEX: 28.63 KG/M2 | SYSTOLIC BLOOD PRESSURE: 144 MMHG | DIASTOLIC BLOOD PRESSURE: 81 MMHG | WEIGHT: 216 LBS | HEIGHT: 73 IN

## 2021-02-02 DIAGNOSIS — Z95.1 POSTSURGICAL AORTOCORONARY BYPASS STATUS: ICD-10-CM

## 2021-02-02 DIAGNOSIS — I25.10 ATHEROSCLEROSIS OF NATIVE CORONARY ARTERY OF NATIVE HEART WITHOUT ANGINA PECTORIS: Primary | ICD-10-CM

## 2021-02-02 DIAGNOSIS — I10 ESSENTIAL HYPERTENSION, BENIGN: ICD-10-CM

## 2021-02-02 DIAGNOSIS — R06.02 SHORTNESS OF BREATH: ICD-10-CM

## 2021-02-02 DIAGNOSIS — E78.5 HYPERLIPIDEMIA, UNSPECIFIED HYPERLIPIDEMIA TYPE: ICD-10-CM

## 2021-02-02 PROCEDURE — G8419 CALC BMI OUT NRM PARAM NOF/U: HCPCS | Performed by: INTERNAL MEDICINE

## 2021-02-02 PROCEDURE — G8536 NO DOC ELDER MAL SCRN: HCPCS | Performed by: INTERNAL MEDICINE

## 2021-02-02 PROCEDURE — G8753 SYS BP > OR = 140: HCPCS | Performed by: INTERNAL MEDICINE

## 2021-02-02 PROCEDURE — G8427 DOCREV CUR MEDS BY ELIG CLIN: HCPCS | Performed by: INTERNAL MEDICINE

## 2021-02-02 PROCEDURE — G8754 DIAS BP LESS 90: HCPCS | Performed by: INTERNAL MEDICINE

## 2021-02-02 PROCEDURE — 1101F PT FALLS ASSESS-DOCD LE1/YR: CPT | Performed by: INTERNAL MEDICINE

## 2021-02-02 PROCEDURE — 3017F COLORECTAL CA SCREEN DOC REV: CPT | Performed by: INTERNAL MEDICINE

## 2021-02-02 PROCEDURE — G8432 DEP SCR NOT DOC, RNG: HCPCS | Performed by: INTERNAL MEDICINE

## 2021-02-02 PROCEDURE — 99214 OFFICE O/P EST MOD 30 MIN: CPT | Performed by: INTERNAL MEDICINE

## 2021-02-02 RX ORDER — LOSARTAN POTASSIUM 50 MG/1
TABLET ORAL DAILY
COMMUNITY

## 2021-02-02 NOTE — PROGRESS NOTES
1. Have you been to the ER, urgent care clinic since your last visit? Hospitalized since your last visit?     no  2. Have you seen or consulted any other health care providers outside of the [address] [place] [address] since your last visit? Include any pap smears or colon screening.       no

## 2021-02-02 NOTE — PROGRESS NOTES
HISTORY OF PRESENT ILLNESS  Dayana Gentile is a 79 y.o. male. Patient with cad,post cabg,htn,dm. On follow up patient denies any chest pains,sob, palpitation or other significant symptoms. 2/2021  Patient is here for follow-up. Patient is complain of shortness of breath on exertion that is new. Denies orthopnea. He denies any chest pain    Hypertension  The history is provided by the patient. This is a chronic problem. The problem occurs constantly. The problem has not changed since onset. Associated symptoms include shortness of breath. Pertinent negatives include no chest pain. Cholesterol Problem  The history is provided by the patient. This is a chronic problem. The problem occurs constantly. The problem has not changed since onset. Associated symptoms include shortness of breath. Pertinent negatives include no chest pain. Review of Systems   Constitutional: Negative for chills and fever. HENT: Negative for nosebleeds. Eyes: Negative for blurred vision and double vision. Respiratory: Positive for shortness of breath. Negative for cough, hemoptysis, sputum production and wheezing. Cardiovascular: Negative for chest pain, palpitations, orthopnea, claudication, leg swelling and PND. Gastrointestinal: Negative for heartburn, nausea and vomiting. Musculoskeletal: Negative for myalgias. Skin: Negative for rash. Neurological: Negative for dizziness and weakness. Endo/Heme/Allergies: Does not bruise/bleed easily. No family history on file.     Past Medical History:   Diagnosis Date    Coronary atherosclerosis of native coronary artery 2009    Stable, now post CABG    Essential hypertension, benign     stable    Other and unspecified hyperlipidemia     LDL less than 100, HDL 37, LFT normal    Postsurgical aortocoronary bypass status     PUD (peptic ulcer disease)     h/o bleeding duodenal ulcer in 2014    Type II or unspecified type diabetes mellitus without mention of complication, not stated as uncontrolled 11/15/2013       Past Surgical History:   Procedure Laterality Date    COLONOSCOPY N/A 10/30/2020    COLONOSCOPY with polypectomy performed by Doni Abdalla MD at [facility]COPY    HX COLONOSCOPY      [address] GRAFT  2009       Social History     Tobacco Use    Smoking status: Former Smoker     Packs/day: 0.25     Quit date: 2005     Years since quittin.0    Smokeless tobacco: Never Used   Substance Use Topics    Alcohol use: No       No Known Allergies    Current Outpatient Medications   Medication Sig    amLODIPine (NORVASC) 5 mg tablet Take 1 Tab by mouth daily.  metoprolol tartrate (LOPRESSOR) 50 mg tablet TAKE 1 TABLET TWICE A DAY    clopidogreL (PLAVIX) 75 mg tab Take 1 Tab by mouth daily.  simvastatin (ZOCOR) 20 mg tablet Take 1 Tab by mouth nightly.  levomefolate/B6/B12/algal oil (L-METHYLFOLATE CA P-5-P ME-CBL PO) Take  by mouth.  canagliflozin (INVOKANA) 100 mg tablet Take 100 mg by mouth Daily (before breakfast).  insulin glargine (LANTUS) 100 unit/mL injection by SubCUTAneous route nightly.  losartan (COZAAR) 100 mg tablet Take 1 Tab by mouth daily. (Patient taking differently: Take 50 mg by mouth daily.)    omega-3 fatty acids-vitamin e (FISH OIL) 1,000 mg cap Take 1 Cap by mouth.  metFORMIN (GLUCOPHAGE) 1,000 mg tablet Take 1,000 mg by mouth two (2) times daily (with meals).  cholecalciferol, vitamin D3, (VITAMIN D3) 2,000 unit Tab Take  by mouth daily. No current facility-administered medications for this visit. There were no vitals taken for this visit. Physical Exam   Constitutional: He is oriented to person, place, and time. He appears well-developed and well-nourished. HENT:   Head: Normocephalic and atraumatic. Eyes: Conjunctivae are normal.   Neck: Neck supple. No JVD present. No tracheal deviation present. No thyromegaly present.    Cardiovascular: Normal rate and regular rhythm. Exam reveals no gallop and no friction rub. Murmur heard. Early systolic murmur is present at the upper right sternal border. Pulmonary/Chest: Breath sounds normal. No respiratory distress. He has no wheezes. He has no rales. He exhibits no tenderness. Abdominal: Soft. There is no abdominal tenderness. Musculoskeletal:         General: No edema. Neurological: He is alert and oriented to person, place, and time. Skin: Skin is warm and dry. Psychiatric: He has a normal mood and affect. Mr. Stanley Cardoza has a reminder for a \"due or due soon\" health maintenance. I have asked that he contact his primary care provider for follow-up on this health maintenance. No flowsheet data found. CARDIOLOGY STUDIES 2011 3/27/2009   Myocardial Perfusion Scan Result - abn scan, small reversible ischemia in Basal part of inferior wall, EF 63%   Cardiac Cath with PCI Result - EF 60%; Triple Bypass    Echocardiogram - Complete Result MILD LVH,NORMAL EF,DD -          Diagnosis: 2014  1. Positive EKG portion of exercise stress test with inferior and lateral ST depression. 2. Shortness of breath and reduced functional tolerance with exercise. 3. Nuclear imaging report to follow. NUCLEAR [email or web address]     Findings:   1. Post-stress imaging in short axis, horizontal and vertical long axis views reveals normal isotope uptake in all areas. 2. Resting images also show normal isotope uptake in all areas. 3. Gated images show normal left ventricular size, wall motion and systolic function. Ejection fraction is 75%. Diagnosis:   1. Normal scan. 2. No evidence of significant fixed or reversible defect suggesting ischemia or myocardial infarction noted from this nuclear study. I Have personally reviewed recent relevant labs available and discussed with patient  2017-lipid  2018-lipid,lft  2019-lipid  2020 - lipids - LDL 67  2020BMP  Assessment       ICD-10-CM ICD-9-CM    1. Atherosclerosis of native coronary artery of native heart without angina pectoris  I25.10 414.01     Stable continue treatment monitor   2. Postsurgical aortocoronary bypass status  Z95.1 V45.81     Stable monitor   3. Hyperlipidemia, unspecified hyperlipidemia type  E78.5 272.4     Continue treatment lab with PCP   4. Essential hypertension, benign  I10 401.1     Stable continue monitoring   2/2020  Stable cardiac status continue current medical management. Lab follow-up with PCP  8/2020  Cardiac status stable. Recent labs reviewed, Lipids controlled. 2/2021  Recent increase shortness of breath. Rule out ischemia or LV dysfunction. Set up for stress test and echo  There are no discontinued medications. No orders of the defined types were placed in this encounter.

## 2021-02-26 ENCOUNTER — OFFICE VISIT (OUTPATIENT)
Dept: CARDIOLOGY CLINIC | Age: 71
End: 2021-02-26
Payer: MEDICARE

## 2021-02-26 VITALS
DIASTOLIC BLOOD PRESSURE: 76 MMHG | BODY MASS INDEX: 28.23 KG/M2 | SYSTOLIC BLOOD PRESSURE: 141 MMHG | HEART RATE: 67 BPM | HEIGHT: 73 IN | TEMPERATURE: 98.9 F | WEIGHT: 213 LBS

## 2021-02-26 DIAGNOSIS — I25.10 ATHEROSCLEROSIS OF NATIVE CORONARY ARTERY OF NATIVE HEART WITHOUT ANGINA PECTORIS: Primary | ICD-10-CM

## 2021-02-26 DIAGNOSIS — I10 ESSENTIAL HYPERTENSION, BENIGN: ICD-10-CM

## 2021-02-26 DIAGNOSIS — E78.5 HYPERLIPIDEMIA, UNSPECIFIED HYPERLIPIDEMIA TYPE: ICD-10-CM

## 2021-02-26 DIAGNOSIS — Z95.1 POSTSURGICAL AORTOCORONARY BYPASS STATUS: ICD-10-CM

## 2021-02-26 DIAGNOSIS — R06.02 SHORTNESS OF BREATH: ICD-10-CM

## 2021-02-26 PROCEDURE — 3017F COLORECTAL CA SCREEN DOC REV: CPT | Performed by: INTERNAL MEDICINE

## 2021-02-26 PROCEDURE — 99214 OFFICE O/P EST MOD 30 MIN: CPT | Performed by: INTERNAL MEDICINE

## 2021-02-26 PROCEDURE — G8754 DIAS BP LESS 90: HCPCS | Performed by: INTERNAL MEDICINE

## 2021-02-26 PROCEDURE — G8419 CALC BMI OUT NRM PARAM NOF/U: HCPCS | Performed by: INTERNAL MEDICINE

## 2021-02-26 PROCEDURE — G8753 SYS BP > OR = 140: HCPCS | Performed by: INTERNAL MEDICINE

## 2021-02-26 PROCEDURE — G8432 DEP SCR NOT DOC, RNG: HCPCS | Performed by: INTERNAL MEDICINE

## 2021-02-26 PROCEDURE — G8427 DOCREV CUR MEDS BY ELIG CLIN: HCPCS | Performed by: INTERNAL MEDICINE

## 2021-02-26 PROCEDURE — G8536 NO DOC ELDER MAL SCRN: HCPCS | Performed by: INTERNAL MEDICINE

## 2021-02-26 PROCEDURE — 1101F PT FALLS ASSESS-DOCD LE1/YR: CPT | Performed by: INTERNAL MEDICINE

## 2021-02-26 NOTE — PROGRESS NOTES
HISTORY OF PRESENT ILLNESS  Beverly Rosa is a 79 y.o. male. Patient with cad,post cabg,htn,dm. On follow up patient denies any chest pains,sob, palpitation or other significant symptoms. 2/2021  Patient is here for follow-up. Patient is complain of shortness of breath on exertion that is new. Denies orthopnea. He denies any chest pain    Hypertension  The history is provided by the patient. This is a chronic problem. The problem occurs constantly. The problem has not changed since onset. Associated symptoms include shortness of breath. Pertinent negatives include no chest pain. Cholesterol Problem  The history is provided by the patient. This is a chronic problem. The problem occurs constantly. The problem has not changed since onset. Associated symptoms include shortness of breath. Pertinent negatives include no chest pain. Review of Systems   Constitutional: Negative for chills and fever. HENT: Negative for nosebleeds. Eyes: Negative for blurred vision and double vision. Respiratory: Positive for shortness of breath. Negative for cough, hemoptysis, sputum production and wheezing. Cardiovascular: Negative for chest pain, palpitations, orthopnea, claudication, leg swelling and PND. Gastrointestinal: Negative for heartburn, nausea and vomiting. Musculoskeletal: Negative for myalgias. Skin: Negative for rash. Neurological: Negative for dizziness and weakness. Endo/Heme/Allergies: Does not bruise/bleed easily. No family history on file.     Past Medical History:   Diagnosis Date    Coronary atherosclerosis of native coronary artery 2009    Stable, now post CABG    Essential hypertension, benign     stable    Other and unspecified hyperlipidemia     LDL less than 100, HDL 37, LFT normal    Postsurgical aortocoronary bypass status     PUD (peptic ulcer disease)     h/o bleeding duodenal ulcer in 2014    Type II or unspecified type diabetes mellitus without mention of complication, not stated as uncontrolled 11/15/2013       Past Surgical History:   Procedure Laterality Date    COLONOSCOPY N/A 10/30/2020    COLONOSCOPY with polypectomy performed by Serena Singh MD at [address] HX COLONOSCOPY      [address] GRAFT  2009       Social History     Tobacco Use    Smoking status: Former Smoker     Packs/day: 0.25     Quit date: 2005     Years since quittin.1    Smokeless tobacco: Never Used   Substance Use Topics    Alcohol use: No       No Known Allergies    Current Outpatient Medications   Medication Sig    losartan (COZAAR) 50 mg tablet Take  by mouth daily.  amLODIPine (NORVASC) 5 mg tablet Take 1 Tab by mouth daily.  metoprolol tartrate (LOPRESSOR) 50 mg tablet TAKE 1 TABLET TWICE A DAY    clopidogreL (PLAVIX) 75 mg tab Take 1 Tab by mouth daily.  simvastatin (ZOCOR) 20 mg tablet Take 1 Tab by mouth nightly.  levomefolate/B6/B12/algal oil (L-METHYLFOLATE CA P-5-P ME-CBL PO) Take  by mouth.  canagliflozin (INVOKANA) 100 mg tablet Take 100 mg by mouth Daily (before breakfast).  insulin glargine (LANTUS) 100 unit/mL injection by SubCUTAneous route nightly.  omega-3 fatty acids-vitamin e (FISH OIL) 1,000 mg cap Take 1 Cap by mouth.  metFORMIN (GLUCOPHAGE) 1,000 mg tablet Take 1,000 mg by mouth two (2) times daily (with meals).  cholecalciferol, vitamin D3, (VITAMIN D3) 2,000 unit Tab Take  by mouth daily. No current facility-administered medications for this visit. Visit Vitals  BP (!) 141/76   Pulse 67   Temp 98.9 °F (37.2 °C) (Temporal)   Ht 6' 1\" (1.854 m)   Wt 96.6 kg (213 lb)   BMI 28.10 kg/m²         Physical Exam   Constitutional: He is oriented to person, place, and time. He appears well-developed and well-nourished. HENT:   Head: Normocephalic and atraumatic. Eyes: Conjunctivae are normal.   Neck: Neck supple. No JVD present. No tracheal deviation present. No thyromegaly present. Cardiovascular: Normal rate and regular rhythm. Exam reveals no gallop and no friction rub. Murmur heard. Early systolic murmur is present at the upper right sternal border. Pulmonary/Chest: Breath sounds normal. No respiratory distress. He has no wheezes. He has no rales. He exhibits no tenderness. Abdominal: Soft. There is no abdominal tenderness. Musculoskeletal:         General: No edema. Neurological: He is alert and oriented to person, place, and time. Skin: Skin is warm and dry. Psychiatric: He has a normal mood and affect. Mr. Rachel Pallas has a reminder for a \"due or due soon\" health maintenance. I have asked that he contact his primary care provider for follow-up on this health maintenance. No flowsheet data found. CARDIOLOGY STUDIES 2011 3/27/2009   Myocardial Perfusion Scan Result - abn scan, small reversible ischemia in Basal part of inferior wall, EF 63%   Cardiac Cath with PCI Result - EF 60%; Triple Bypass    Echocardiogram - Complete Result MILD LVH,NORMAL EF,DD -          Diagnosis: 2014  1. Positive EKG portion of exercise stress test with inferior and lateral ST depression. 2. Shortness of breath and reduced functional tolerance with exercise. 3. Nuclear imaging report to follow. NUCLEAR [email or web address]     Findings:   1. Post-stress imaging in short axis, horizontal and vertical long axis views reveals normal isotope uptake in all areas. 2. Resting images also show normal isotope uptake in all areas. 3. Gated images show normal left ventricular size, wall motion and systolic function. Ejection fraction is 75%. Diagnosis:   1. Normal scan. 2. No evidence of significant fixed or reversible defect suggesting ischemia or myocardial infarction noted from this nuclear study.        I Have personally reviewed recent relevant labs available and discussed with patient  2017-lipid  2018-lipid,lft  2019-lipid  2020 - lipids - LDL 79  12/2020[facility]      Procedure Conclusion 2/2021    Nuclear Stress Test    Nuclear Cardiac Spect Rest then Gated Stress study. Exercise was used as the stressing method and agent. One day myocardial perfusion study. Date: 2/23/2021. Left ventricular perfusion is normal. Myocardial perfusion imaging supports a low risk stress test.   There is a prior study available for comparison. . As compared to the previous study, there are no significant changes     Interpretation Summary 2/2021    · LV: Estimated LVEF is 60 - 65%. Normal cavity size and systolic function (ejection fraction normal). Moderate concentric hypertrophy. Wall motion: normal. Mild (grade 1) left ventricular diastolic dysfunction Moderate (grade 2) left ventricular diastolic dysfunction. · LA: Left Atrium volume index is 29.98 mL/m2. · RV: Normal right ventricular size and function. · No hemodynamically significant valvular pathology. Assessment       ICD-10-CM ICD-9-CM    1. Atherosclerosis of native coronary artery of native heart without angina pectoris  I25.10 414.01     Stable symptoms stable continue treatment   2. Shortness of breath  R06.02 786.05     Stable. Normal ejection fraction no pulmonary hypertension no significant valvular pathology monitor   3. Postsurgical aortocoronary bypass status  Z95.1 V45.81     Negative stress test monitor clinically   4. Hyperlipidemia, unspecified hyperlipidemia type  E78.5 272.4     Continue current treatment lab with PCP   5. Essential hypertension, benign  I10 401.1     Stable continue monitoring continue treatment   2/2020  Stable cardiac status continue current medical management. Lab follow-up with PCP  8/2020  Cardiac status stable. Recent labs reviewed, Lipids controlled. 2/2021  Recent increase shortness of breath. Rule out ischemia or LV dysfunction.   Set up for stress test and echo  2/26/2021  Continue monitoring clinically normal ejection fraction no significant valvular dysfunction. Negative stress test.  Will monitor shortness of breath clinically and monitor blood pressure. Lab with PCP  There are no discontinued medications. No orders of the defined types were placed in this encounter. Follow-up and Dispositions    · Return in about 6 months (around 8/26/2021).

## 2021-04-30 DIAGNOSIS — E78.5 HYPERLIPIDEMIA, UNSPECIFIED HYPERLIPIDEMIA TYPE: ICD-10-CM

## 2021-04-30 DIAGNOSIS — I10 ESSENTIAL HYPERTENSION, BENIGN: ICD-10-CM

## 2021-04-30 RX ORDER — SIMVASTATIN 20 MG/1
20 TABLET, FILM COATED ORAL
Qty: 90 TAB | Refills: 4 | OUTPATIENT
Start: 2021-04-30

## 2021-04-30 RX ORDER — METOPROLOL TARTRATE 50 MG/1
TABLET ORAL
Qty: 180 TAB | Refills: 3 | Status: SHIPPED | OUTPATIENT
Start: 2021-04-30 | End: 2022-05-10

## 2021-04-30 RX ORDER — SIMVASTATIN 20 MG/1
20 TABLET, FILM COATED ORAL
Qty: 90 TAB | Refills: 4 | Status: SHIPPED | OUTPATIENT
Start: 2021-04-30 | End: 2022-05-10

## 2021-04-30 RX ORDER — METOPROLOL TARTRATE 50 MG/1
50 TABLET ORAL 2 TIMES DAILY
Qty: 180 TAB | Refills: 3 | OUTPATIENT
Start: 2021-04-30

## 2021-06-15 DIAGNOSIS — I10 ESSENTIAL HYPERTENSION, BENIGN: ICD-10-CM

## 2021-06-15 RX ORDER — CLOPIDOGREL BISULFATE 75 MG/1
75 TABLET ORAL DAILY
Qty: 90 TABLET | Refills: 3 | Status: SHIPPED | OUTPATIENT
Start: 2021-06-15 | End: 2022-06-15

## 2021-06-15 RX ORDER — AMLODIPINE BESYLATE 5 MG/1
5 TABLET ORAL DAILY
Qty: 90 TABLET | Refills: 3 | Status: SHIPPED | OUTPATIENT
Start: 2021-06-15 | End: 2022-06-15

## 2021-09-07 ENCOUNTER — OFFICE VISIT (OUTPATIENT)
Dept: CARDIOLOGY CLINIC | Age: 71
End: 2021-09-07
Payer: MEDICARE

## 2021-09-07 VITALS
WEIGHT: 209 LBS | BODY MASS INDEX: 27.7 KG/M2 | HEIGHT: 73 IN | HEART RATE: 66 BPM | DIASTOLIC BLOOD PRESSURE: 70 MMHG | SYSTOLIC BLOOD PRESSURE: 132 MMHG

## 2021-09-07 DIAGNOSIS — Z95.1 POSTSURGICAL AORTOCORONARY BYPASS STATUS: ICD-10-CM

## 2021-09-07 DIAGNOSIS — I25.10 ATHEROSCLEROSIS OF NATIVE CORONARY ARTERY OF NATIVE HEART WITHOUT ANGINA PECTORIS: Primary | ICD-10-CM

## 2021-09-07 DIAGNOSIS — I10 ESSENTIAL HYPERTENSION, BENIGN: ICD-10-CM

## 2021-09-07 DIAGNOSIS — E78.5 HYPERLIPIDEMIA, UNSPECIFIED HYPERLIPIDEMIA TYPE: ICD-10-CM

## 2021-09-07 PROCEDURE — G8536 NO DOC ELDER MAL SCRN: HCPCS | Performed by: INTERNAL MEDICINE

## 2021-09-07 PROCEDURE — 1101F PT FALLS ASSESS-DOCD LE1/YR: CPT | Performed by: INTERNAL MEDICINE

## 2021-09-07 PROCEDURE — G8432 DEP SCR NOT DOC, RNG: HCPCS | Performed by: INTERNAL MEDICINE

## 2021-09-07 PROCEDURE — 99214 OFFICE O/P EST MOD 30 MIN: CPT | Performed by: INTERNAL MEDICINE

## 2021-09-07 PROCEDURE — G8754 DIAS BP LESS 90: HCPCS | Performed by: INTERNAL MEDICINE

## 2021-09-07 PROCEDURE — G8419 CALC BMI OUT NRM PARAM NOF/U: HCPCS | Performed by: INTERNAL MEDICINE

## 2021-09-07 PROCEDURE — 3017F COLORECTAL CA SCREEN DOC REV: CPT | Performed by: INTERNAL MEDICINE

## 2021-09-07 PROCEDURE — G8427 DOCREV CUR MEDS BY ELIG CLIN: HCPCS | Performed by: INTERNAL MEDICINE

## 2021-09-07 PROCEDURE — G8752 SYS BP LESS 140: HCPCS | Performed by: INTERNAL MEDICINE

## 2021-09-07 NOTE — PROGRESS NOTES
HISTORY OF PRESENT ILLNESS  Shirin Headley is a 70 y.o. male. Patient with cad,post cabg,htn,dm. On follow up patient denies any chest pains,sob, palpitation or other significant symptoms. 2/2021  Patient is here for follow-up. Patient is complain of shortness of breath on exertion that is new. Denies orthopnea. He denies any chest pain  9/2021  Patient is here for follow-up. His shortness of breath is stable. Denies any chest pain. Denies any other significant symptoms    Hypertension  The history is provided by the patient. This is a chronic problem. The problem occurs constantly. The problem has not changed since onset. Associated symptoms include shortness of breath. Pertinent negatives include no chest pain. Cholesterol Problem  The history is provided by the patient. This is a chronic problem. The problem occurs constantly. The problem has not changed since onset. Associated symptoms include shortness of breath. Pertinent negatives include no chest pain. Review of Systems   Constitutional: Negative for chills and fever. HENT: Negative for nosebleeds. Eyes: Negative for blurred vision and double vision. Respiratory: Positive for shortness of breath. Negative for cough, hemoptysis, sputum production and wheezing. Cardiovascular: Negative for chest pain, palpitations, orthopnea, claudication, leg swelling and PND. Gastrointestinal: Negative for heartburn, nausea and vomiting. Musculoskeletal: Negative for myalgias. Skin: Negative for rash. Neurological: Negative for dizziness and weakness. Endo/Heme/Allergies: Does not bruise/bleed easily. No family history on file.     Past Medical History:   Diagnosis Date    Coronary atherosclerosis of native coronary artery 2009    Stable, now post CABG    Essential hypertension, benign     stable    Other and unspecified hyperlipidemia     LDL less than 100, HDL 37, LFT normal    Postsurgical aortocoronary bypass status     PUD (peptic ulcer disease)     h/o bleeding duodenal ulcer in 2014    Type II or unspecified type diabetes mellitus without mention of complication, not stated as uncontrolled 11/15/2013       Past Surgical History:   Procedure Laterality Date    COLONOSCOPY N/A 10/30/2020    COLONOSCOPY with polypectomy performed by Ronnie Prado MD at [address] HX COLONOSCOPY      [address] GRAFT  2009       Social History     Tobacco Use    Smoking status: Former Smoker     Packs/day: 0.25     Quit date: 2005     Years since quittin.6    Smokeless tobacco: Never Used   Substance Use Topics    Alcohol use: No       No Known Allergies    Current Outpatient Medications   Medication Sig    clopidogreL (PLAVIX) 75 mg tab Take 1 Tablet by mouth daily.  amLODIPine (NORVASC) 5 mg tablet Take 1 Tablet by mouth daily.  metoprolol tartrate (LOPRESSOR) 50 mg tablet TAKE 1 TABLET TWICE A DAY    simvastatin (ZOCOR) 20 mg tablet Take 1 Tab by mouth nightly.  losartan (COZAAR) 50 mg tablet Take  by mouth daily.  levomefolate/B6/B12/algal oil (L-METHYLFOLATE CA P-5-P ME-CBL PO) Take  by mouth.  canagliflozin (INVOKANA) 100 mg tablet Take 100 mg by mouth Daily (before breakfast).  insulin glargine (LANTUS) 100 unit/mL injection by SubCUTAneous route nightly.  omega-3 fatty acids-vitamin e (FISH OIL) 1,000 mg cap Take 1 Cap by mouth.  metFORMIN (GLUCOPHAGE) 1,000 mg tablet Take 1,000 mg by mouth two (2) times daily (with meals).  cholecalciferol, vitamin D3, (VITAMIN D3) 2,000 unit Tab Take  by mouth daily. No current facility-administered medications for this visit. Visit Vitals  /70   Pulse 66   Ht 6' 1\" (1.854 m)   Wt 94.8 kg (209 lb)   BMI 27.57 kg/m²         Physical Exam  Constitutional:       Appearance: He is well-developed. HENT:      Head: Normocephalic and atraumatic.    Eyes:      Conjunctiva/sclera: Conjunctivae normal.   Neck:      Thyroid: No thyromegaly. Vascular: No JVD. Trachea: No tracheal deviation. Cardiovascular:      Rate and Rhythm: Normal rate and regular rhythm. Heart sounds: Murmur heard. Early systolic murmur is present at the upper right sternal border. No friction rub. No gallop. Pulmonary:      Effort: No respiratory distress. Breath sounds: Normal breath sounds. No wheezing or rales. Chest:      Chest wall: No tenderness. Abdominal:      Palpations: Abdomen is soft. Tenderness: There is no abdominal tenderness. Musculoskeletal:      Cervical back: Neck supple. Skin:     General: Skin is warm and dry. Neurological:      Mental Status: He is alert and oriented to person, place, and time. Mr. Carroll Solo has a reminder for a \"due or due soon\" health maintenance. I have asked that he contact his primary care provider for follow-up on this health maintenance. No flowsheet data found. CARDIOLOGY STUDIES 2011 3/27/2009   Myocardial Perfusion Scan Result - abn scan, small reversible ischemia in Basal part of inferior wall, EF 63%   Cardiac Cath with PCI Result - EF 60%; Triple Bypass    Echocardiogram - Complete Result MILD LVH,NORMAL EF,DD -          Diagnosis: 2014  1. Positive EKG portion of exercise stress test with inferior and lateral ST depression. 2. Shortness of breath and reduced functional tolerance with exercise. 3. Nuclear imaging report to follow. NUCLEAR [id number]     Findings:   1. Post-stress imaging in short axis, horizontal and vertical long axis views reveals normal isotope uptake in all areas. 2. Resting images also show normal isotope uptake in all areas. 3. Gated images show normal left ventricular size, wall motion and systolic function. Ejection fraction is 75%. Diagnosis:   1. Normal scan. 2. No evidence of significant fixed or reversible defect suggesting ischemia or myocardial infarction noted from this nuclear study.        I Have personally reviewed recent relevant labs available and discussed with patient  2/2017-lipid  8/2018-lipid,lft  5/2019-lipid  7/2020 - lipids - LDL 67  12/2020BMP      Procedure Conclusion 2/2021    Nuclear Stress Test    Nuclear Cardiac Spect Rest then Gated Stress study. Exercise was used as the stressing method and agent. One day myocardial perfusion study. Date: 2/23/2021. Left ventricular perfusion is normal. Myocardial perfusion imaging supports a low risk stress test.   There is a prior study available for comparison. . As compared to the previous study, there are no significant changes     Interpretation Summary 2/2021    · LV: Estimated LVEF is 60 - 65%. Normal cavity size and systolic function (ejection fraction normal). Moderate concentric hypertrophy. Wall motion: normal. Mild (grade 1) left ventricular diastolic dysfunction Moderate (grade 2) left ventricular diastolic dysfunction. · LA: Left Atrium volume index is 29.98 mL/m2. · RV: Normal right ventricular size and function. · No hemodynamically significant valvular pathology. Ref Range & Units 2 mo ago 7/2021   Cholesterol 110 - 200 mg/dL 137        Triglyceride 40 - 149 mg/dL 135        HDL >=40 mg/dL 38Low         Cholesterol/HDL 0.0 - 5.0  3.6        Non-HDL Cholesterol <130 mg/dL 99        LDL CALCULATION 50 - 99 mg/dL 72        VLDL CALCULATION 8 - 30 mg/dL 27        LDL/HDL Ratio   1.9            Assessment       ICD-10-CM ICD-9-CM    1. Atherosclerosis of native coronary artery of native heart without angina pectoris  I25.10 414.01     Stable continue current treatment monitor   2. Essential hypertension, benign  I10 401.1     Stable continue therapy   3. Hyperlipidemia, unspecified hyperlipidemia type  E78.5 272.4     Continue treatment lab with PCP   4. Postsurgical aortocoronary bypass status  Z95.1 V45.81     Stable monitor   2/2020  Stable cardiac status continue current medical management.   Lab follow-up with PCP  8/2020  Cardiac status stable. Recent labs reviewed, Lipids controlled. 2/2021  Recent increase shortness of breath. Rule out ischemia or LV dysfunction. Set up for stress test and echo  2/26/2021  Continue monitoring clinically normal ejection fraction no significant valvular dysfunction. Negative stress test.  Will monitor shortness of breath clinically and monitor blood pressure. Lab with PCP  9/2021  Cardiac status stable. Blood pressure controlled. Symptoms are controlled. Continue treatment lab review  There are no discontinued medications. No orders of the defined types were placed in this encounter. Follow-up and Dispositions    · Return in about 6 months (around 3/7/2022).

## 2021-09-07 NOTE — PROGRESS NOTES
1. Have you been to the ER, urgent care clinic since your last visit? Hospitalized since your last visit?     no  2. Have you seen or consulted any other health care providers outside of the [address] [place] [address] since your last visit? Include any pap smears or colon screening.       Yes Where: Dr. Venessa Fagan

## 2022-03-10 ENCOUNTER — OFFICE VISIT (OUTPATIENT)
Dept: CARDIOLOGY CLINIC | Age: 72
End: 2022-03-10
Payer: MEDICARE

## 2022-03-10 VITALS
DIASTOLIC BLOOD PRESSURE: 76 MMHG | SYSTOLIC BLOOD PRESSURE: 127 MMHG | OXYGEN SATURATION: 97 % | HEIGHT: 73 IN | WEIGHT: 197 LBS | BODY MASS INDEX: 26.11 KG/M2 | HEART RATE: 74 BPM

## 2022-03-10 DIAGNOSIS — I10 ESSENTIAL HYPERTENSION, BENIGN: ICD-10-CM

## 2022-03-10 DIAGNOSIS — E78.5 HYPERLIPIDEMIA, UNSPECIFIED HYPERLIPIDEMIA TYPE: ICD-10-CM

## 2022-03-10 DIAGNOSIS — I25.10 ATHEROSCLEROSIS OF NATIVE CORONARY ARTERY OF NATIVE HEART WITHOUT ANGINA PECTORIS: Primary | ICD-10-CM

## 2022-03-10 DIAGNOSIS — Z95.1 POSTSURGICAL AORTOCORONARY BYPASS STATUS: ICD-10-CM

## 2022-03-10 PROCEDURE — 3017F COLORECTAL CA SCREEN DOC REV: CPT | Performed by: INTERNAL MEDICINE

## 2022-03-10 PROCEDURE — G8754 DIAS BP LESS 90: HCPCS | Performed by: INTERNAL MEDICINE

## 2022-03-10 PROCEDURE — G8427 DOCREV CUR MEDS BY ELIG CLIN: HCPCS | Performed by: INTERNAL MEDICINE

## 2022-03-10 PROCEDURE — G8752 SYS BP LESS 140: HCPCS | Performed by: INTERNAL MEDICINE

## 2022-03-10 PROCEDURE — G8536 NO DOC ELDER MAL SCRN: HCPCS | Performed by: INTERNAL MEDICINE

## 2022-03-10 PROCEDURE — 1101F PT FALLS ASSESS-DOCD LE1/YR: CPT | Performed by: INTERNAL MEDICINE

## 2022-03-10 PROCEDURE — G8419 CALC BMI OUT NRM PARAM NOF/U: HCPCS | Performed by: INTERNAL MEDICINE

## 2022-03-10 PROCEDURE — 99214 OFFICE O/P EST MOD 30 MIN: CPT | Performed by: INTERNAL MEDICINE

## 2022-03-10 PROCEDURE — G8510 SCR DEP NEG, NO PLAN REQD: HCPCS | Performed by: INTERNAL MEDICINE

## 2022-03-10 RX ORDER — BLOOD SUGAR DIAGNOSTIC
STRIP MISCELLANEOUS
COMMUNITY
Start: 2021-12-27

## 2022-03-10 RX ORDER — BRIMONIDINE TARTRATE, TIMOLOL MALEATE 2; 5 MG/ML; MG/ML
SOLUTION/ DROPS OPHTHALMIC
COMMUNITY
Start: 2022-03-01

## 2022-03-10 RX ORDER — BRINZOLAMIDE 10 MG/ML
SUSPENSION/ DROPS OPHTHALMIC
COMMUNITY
Start: 2021-12-28

## 2022-03-10 RX ORDER — DULAGLUTIDE 1.5 MG/.5ML
INJECTION, SOLUTION SUBCUTANEOUS
COMMUNITY
Start: 2022-02-23

## 2022-03-10 RX ORDER — TADALAFIL 20 MG/1
TABLET ORAL
COMMUNITY
Start: 2021-06-17

## 2022-03-10 NOTE — PROGRESS NOTES
1. Have you been to the ER, urgent care clinic since your last visit? Hospitalized since your last visit? No    2. Have you seen or consulted any other health care providers outside of the [address] [place] [address] since your last visit? Include any pap smears or colon screening. Yes Where: Dr. Palak Mireles PCP for routine checkup     3. Do you need any refills today?   no

## 2022-03-10 NOTE — PROGRESS NOTES
HISTORY OF PRESENT ILLNESS  Mani Barfield is a 70 y.o. male. Patient with cad,post cabg,htn,dm. On follow up patient denies any chest pains,sob, palpitation or other significant symptoms. 2/2021  Patient is here for follow-up. Patient is complain of shortness of breath on exertion that is new. Denies orthopnea. He denies any chest pain  9/2021  Patient is here for follow-up. His shortness of breath is stable. Denies any chest pain. Denies any other significant symptoms  3/2022  Patient seen and for follow-up. He has shortness of breath on exertion stable pattern. No chest pain    Hypertension  The history is provided by the patient. This is a chronic problem. The problem occurs constantly. The problem has not changed since onset. Associated symptoms include shortness of breath. Pertinent negatives include no chest pain. Cholesterol Problem  The history is provided by the patient. This is a chronic problem. The problem occurs constantly. The problem has not changed since onset. Associated symptoms include shortness of breath. Pertinent negatives include no chest pain. Follow-up  Associated symptoms include shortness of breath. Pertinent negatives include no chest pain. Review of Systems   Constitutional: Negative for chills and fever. HENT: Negative for nosebleeds. Eyes: Negative for blurred vision and double vision. Respiratory: Positive for shortness of breath. Negative for cough, hemoptysis, sputum production and wheezing. Cardiovascular: Negative for chest pain, palpitations, orthopnea, claudication, leg swelling and PND. Gastrointestinal: Negative for heartburn, nausea and vomiting. Musculoskeletal: Negative for myalgias. Skin: Negative for rash. Neurological: Negative for dizziness and weakness. Endo/Heme/Allergies: Does not bruise/bleed easily. No family history on file.     Past Medical History:   Diagnosis Date    Coronary atherosclerosis of native coronary artery 2009    Stable, now post CABG    Essential hypertension, benign     stable    Other and unspecified hyperlipidemia     LDL less than 100, HDL 37, LFT normal    Postsurgical aortocoronary bypass status     PUD (peptic ulcer disease)     h/o bleeding duodenal ulcer in 2014    Type II or unspecified type diabetes mellitus without mention of complication, not stated as uncontrolled 11/15/2013       Past Surgical History:   Procedure Laterality Date    COLONOSCOPY N/A 10/30/2020    COLONOSCOPY with polypectomy performed by Primitivo Renteria MD at [address] HX COLONOSCOPY      HX CORONARY ARTERY BYPASS GRAFT         Social History     Tobacco Use    Smoking status: Former Smoker     Packs/day: 0.25     Quit date: 2005     Years since quittin.1    Smokeless tobacco: Never Used   Substance Use Topics    Alcohol use: No       No Known Allergies    Current Outpatient Medications   Medication Sig    Trulicity 1.5 EX/7.8 mL sub-q pen     tadalafiL (CIALIS) 20 mg tablet TAKE 1 TABLET DAILY AS     NEEDED    brimonidine-timoloL (COMBIGAN) 0.2-0.5 % drop ophthalmic solution INSTILL 1 DROP IN BOTH EYES TWICE DAILY    Accu-Chek [place] Plus test strp strip USE TWICE DAILY    brinzolamide (AZOPT) 1 % ophthalmic suspension SHAKE LIQUID AND INSTILL 1 DROP IN BOTH EYES TWICE DAILY    bimatoprost (LUMIGAN) 0.01 % ophthalmic drops 1 Drop nightly.  clopidogreL (PLAVIX) 75 mg tab Take 1 Tablet by mouth daily.  amLODIPine (NORVASC) 5 mg tablet Take 1 Tablet by mouth daily.  metoprolol tartrate (LOPRESSOR) 50 mg tablet TAKE 1 TABLET TWICE A DAY    simvastatin (ZOCOR) 20 mg tablet Take 1 Tab by mouth nightly.  losartan (COZAAR) 50 mg tablet Take  by mouth daily.  levomefolate/B6/B12/algal oil (L-METHYLFOLATE CA P-5-P ME-CBL PO) Take  by mouth.  canagliflozin (INVOKANA) 100 mg tablet Take 100 mg by mouth Daily (before breakfast).     insulin glargine (LANTUS) 100 unit/mL injection by SubCUTAneous route nightly.  omega-3 fatty acids-vitamin e (FISH OIL) 1,000 mg cap Take 1 Cap by mouth.  metFORMIN (GLUCOPHAGE) 1,000 mg tablet Take 1,000 mg by mouth two (2) times daily (with meals).  cholecalciferol, vitamin D3, (VITAMIN D3) 2,000 unit Tab Take  by mouth daily. No current facility-administered medications for this visit. Visit Vitals  /76 (BP 1 Location: Left upper arm, BP Patient Position: Sitting, BP Cuff Size: Adult)   Pulse 74   Ht 6' 1\" (1.854 m)   Wt 89.4 kg (197 lb)   SpO2 97%   BMI 25.99 kg/m²         Physical Exam  Constitutional:       Appearance: He is well-developed. HENT:      Head: Normocephalic and atraumatic. Eyes:      Conjunctiva/sclera: Conjunctivae normal.   Neck:      Thyroid: No thyromegaly. Vascular: No JVD. Trachea: No tracheal deviation. Cardiovascular:      Rate and Rhythm: Normal rate and regular rhythm. Heart sounds: Murmur heard. Early systolic murmur is present at the upper right sternal border. No friction rub. No gallop. Pulmonary:      Effort: No respiratory distress. Breath sounds: Normal breath sounds. No wheezing or rales. Chest:      Chest wall: No tenderness. Abdominal:      Palpations: Abdomen is soft. Tenderness: There is no abdominal tenderness. Musculoskeletal:      Cervical back: Neck supple. Skin:     General: Skin is warm and dry. Neurological:      Mental Status: He is alert and oriented to person, place, and time. Mr. Iraida Hilton has a reminder for a \"due or due soon\" health maintenance. I have asked that he contact his primary care provider for follow-up on this health maintenance. No flowsheet data found. CARDIOLOGY STUDIES 6/2/2011 3/27/2009   Myocardial Perfusion Scan Result - abn scan, small reversible ischemia in Basal part of inferior wall, EF 63%   Cardiac Cath with PCI Result - EF 60%;  Triple Bypass 4/09   Echocardiogram - Complete Result MILD LVH,NORMAL EF,DD -          Diagnosis: 2014  1. Positive EKG portion of exercise stress test with inferior and lateral ST depression. 2. Shortness of breath and reduced functional tolerance with exercise. 3. Nuclear imaging report to follow. NUCLEAR [email or web address]     Findings:   1. Post-stress imaging in short axis, horizontal and vertical long axis views reveals normal isotope uptake in all areas. 2. Resting images also show normal isotope uptake in all areas. 3. Gated images show normal left ventricular size, wall motion and systolic function. Ejection fraction is 75%. Diagnosis:   1. Normal scan. 2. No evidence of significant fixed or reversible defect suggesting ischemia or myocardial infarction noted from this nuclear study. I Have personally reviewed recent relevant labs available and discussed with patient  2017-lipid  2018-lipid,lft  2019-lipid  2020 - lipids - LDL 67  2020BMP      Procedure Conclusion 2021    Nuclear Stress Test    Nuclear Cardiac Spect Rest then Gated Stress study. Exercise was used as the stressing method and agent. One day myocardial perfusion study. Date: 2021. Left ventricular perfusion is normal. Myocardial perfusion imaging supports a low risk stress test.   There is a prior study available for comparison. . As compared to the previous study, there are no significant changes     Interpretation Summary 2021    · LV: Estimated LVEF is 60 - 65%. Normal cavity size and systolic function (ejection fraction normal). Moderate concentric hypertrophy. Wall motion: normal. Mild (grade 1) left ventricular diastolic dysfunction Moderate (grade 2) left ventricular diastolic dysfunction. · LA: Left Atrium volume index is 29.98 mL/m2. · RV: Normal right ventricular size and function. · No hemodynamically significant valvular pathology.       Ref Range & Units 2 mo ago 2021   Cholesterol 110 - 200 mg/dL 137        Triglyceride 40 - 149 mg/dL 135        HDL >=40 mg/dL 38Low         Cholesterol/HDL 0.0 - 5.0  3.6        Non-HDL Cholesterol <130 mg/dL 99        LDL CALCULATION 50 - 99 mg/dL 72        VLDL CALCULATION 8 - 30 mg/dL 27        LDL/HDL Ratio   1.9      I Have personally reviewed recent relevant labs available and discussed with patient  To 2022CMP, hemoglobin A1c      Assessment       ICD-10-CM ICD-9-CM    1. Atherosclerosis of native coronary artery of native heart without angina pectoris  I25.10 414.01     Stable continue current medical management monitor   2. Essential hypertension, benign  I10 401.1     Stable monitor   3. Hyperlipidemia, unspecified hyperlipidemia type  E78.5 272.4     Continue treatment lab with PCP   4. Postsurgical aortocoronary bypass status  Z95.1 V45.81     Stable   2/2020  Stable cardiac status continue current medical management. Lab follow-up with PCP  8/2020  Cardiac status stable. Recent labs reviewed, Lipids controlled. 2/2021  Recent increase shortness of breath. Rule out ischemia or LV dysfunction. Set up for stress test and echo  2/26/2021  Continue monitoring clinically normal ejection fraction no significant valvular dysfunction. Negative stress test.  Will monitor shortness of breath clinically and monitor blood pressure. Lab with PCP  9/2021  Cardiac status stable. Blood pressure controlled. Symptoms are controlled. Continue treatment lab review  3/2022  Stable cardiac status continue current medical management. There are no discontinued medications. No orders of the defined types were placed in this encounter. Follow-up and Dispositions    · Return in about 6 months (around 9/10/2022).

## 2022-05-10 DIAGNOSIS — E78.5 HYPERLIPIDEMIA, UNSPECIFIED HYPERLIPIDEMIA TYPE: ICD-10-CM

## 2022-05-10 DIAGNOSIS — I10 ESSENTIAL HYPERTENSION, BENIGN: ICD-10-CM

## 2022-05-10 RX ORDER — METOPROLOL TARTRATE 50 MG/1
TABLET ORAL
Qty: 180 TABLET | Refills: 3 | Status: SHIPPED | OUTPATIENT
Start: 2022-05-10

## 2022-05-10 RX ORDER — SIMVASTATIN 20 MG/1
TABLET, FILM COATED ORAL
Qty: 90 TABLET | Refills: 4 | Status: SHIPPED | OUTPATIENT
Start: 2022-05-10 | End: 2022-09-08

## 2022-06-15 DIAGNOSIS — I10 ESSENTIAL HYPERTENSION, BENIGN: ICD-10-CM

## 2022-06-15 RX ORDER — CLOPIDOGREL BISULFATE 75 MG/1
75 TABLET ORAL DAILY
Qty: 90 TABLET | Refills: 3 | Status: SHIPPED | OUTPATIENT
Start: 2022-06-15

## 2022-06-15 RX ORDER — AMLODIPINE BESYLATE 5 MG/1
5 TABLET ORAL DAILY
Qty: 90 TABLET | Refills: 3 | Status: SHIPPED | OUTPATIENT
Start: 2022-06-15

## 2022-09-08 ENCOUNTER — OFFICE VISIT (OUTPATIENT)
Dept: CARDIOLOGY CLINIC | Age: 72
End: 2022-09-08
Payer: MEDICARE

## 2022-09-08 VITALS
HEART RATE: 70 BPM | OXYGEN SATURATION: 97 % | HEIGHT: 73 IN | SYSTOLIC BLOOD PRESSURE: 117 MMHG | DIASTOLIC BLOOD PRESSURE: 73 MMHG | WEIGHT: 207 LBS | BODY MASS INDEX: 27.43 KG/M2

## 2022-09-08 DIAGNOSIS — I10 ESSENTIAL HYPERTENSION, BENIGN: ICD-10-CM

## 2022-09-08 DIAGNOSIS — Z95.1 POSTSURGICAL AORTOCORONARY BYPASS STATUS: ICD-10-CM

## 2022-09-08 DIAGNOSIS — E78.5 HYPERLIPIDEMIA, UNSPECIFIED HYPERLIPIDEMIA TYPE: ICD-10-CM

## 2022-09-08 DIAGNOSIS — I25.10 ATHEROSCLEROSIS OF NATIVE CORONARY ARTERY OF NATIVE HEART WITHOUT ANGINA PECTORIS: Primary | ICD-10-CM

## 2022-09-08 PROCEDURE — G8754 DIAS BP LESS 90: HCPCS | Performed by: INTERNAL MEDICINE

## 2022-09-08 PROCEDURE — 3017F COLORECTAL CA SCREEN DOC REV: CPT | Performed by: INTERNAL MEDICINE

## 2022-09-08 PROCEDURE — G8417 CALC BMI ABV UP PARAM F/U: HCPCS | Performed by: INTERNAL MEDICINE

## 2022-09-08 PROCEDURE — G8427 DOCREV CUR MEDS BY ELIG CLIN: HCPCS | Performed by: INTERNAL MEDICINE

## 2022-09-08 PROCEDURE — 1123F ACP DISCUSS/DSCN MKR DOCD: CPT | Performed by: INTERNAL MEDICINE

## 2022-09-08 PROCEDURE — G8432 DEP SCR NOT DOC, RNG: HCPCS | Performed by: INTERNAL MEDICINE

## 2022-09-08 PROCEDURE — 99214 OFFICE O/P EST MOD 30 MIN: CPT | Performed by: INTERNAL MEDICINE

## 2022-09-08 PROCEDURE — 1101F PT FALLS ASSESS-DOCD LE1/YR: CPT | Performed by: INTERNAL MEDICINE

## 2022-09-08 PROCEDURE — G8752 SYS BP LESS 140: HCPCS | Performed by: INTERNAL MEDICINE

## 2022-09-08 PROCEDURE — G8536 NO DOC ELDER MAL SCRN: HCPCS | Performed by: INTERNAL MEDICINE

## 2022-09-08 RX ORDER — ATORVASTATIN CALCIUM 20 MG/1
TABLET, FILM COATED ORAL
COMMUNITY
Start: 2022-08-04

## 2022-09-08 NOTE — PROGRESS NOTES
1. Have you been to the ER, urgent care clinic since your last visit? Hospitalized since your last visit? No    2. Have you seen or consulted any other health care providers outside of the [address] [place] [address] since your last visit? Include any pap smears or colon screening.  Yes Where: PCP

## 2022-09-08 NOTE — PROGRESS NOTES
HISTORY OF PRESENT ILLNESS  Verner Cords is a 67 y.o. male. Patient with cad,post cabg,htn,dm. On follow up patient denies any chest pains,sob, palpitation or other significant symptoms. 2/2021  Patient is here for follow-up. Patient is complain of shortness of breath on exertion that is new. Denies orthopnea. He denies any chest pain  9/2021  Patient is here for follow-up. His shortness of breath is stable. Denies any chest pain. Denies any other significant symptoms  3/2022  Patient seen and for follow-up. He has shortness of breath on exertion stable pattern. No chest pain    Follow-up  Associated symptoms include shortness of breath. Pertinent negatives include no chest pain. Hypertension  The history is provided by the Patient. This is a chronic problem. The problem occurs constantly. The problem has not changed since onset. Associated symptoms include shortness of breath. Pertinent negatives include no chest pain. Cholesterol Problem  The history is provided by the Patient. This is a chronic problem. The problem occurs constantly. The problem has not changed since onset. Associated symptoms include shortness of breath. Pertinent negatives include no chest pain. Review of Systems   Constitutional:  Negative for chills and fever. HENT:  Negative for nosebleeds. Eyes:  Negative for blurred vision and double vision. Respiratory:  Positive for shortness of breath. Negative for cough, hemoptysis, sputum production and wheezing. Cardiovascular:  Negative for chest pain, palpitations, orthopnea, claudication, leg swelling and PND. Gastrointestinal:  Negative for heartburn, nausea and vomiting. Musculoskeletal:  Negative for myalgias. Skin:  Negative for rash. Neurological:  Negative for dizziness and weakness. Endo/Heme/Allergies:  Does not bruise/bleed easily. No family history on file.     Past Medical History:   Diagnosis Date    Coronary atherosclerosis of native coronary artery 2009    Stable, now post CABG    Essential hypertension, benign     stable    Other and unspecified hyperlipidemia     LDL less than 100, HDL 37, LFT normal    Postsurgical aortocoronary bypass status     PUD (peptic ulcer disease)     h/o bleeding duodenal ulcer in 2014    Type II or unspecified type diabetes mellitus without mention of complication, not stated as uncontrolled 11/15/2013       Past Surgical History:   Procedure Laterality Date    COLONOSCOPY N/A 10/30/2020    COLONOSCOPY with polypectomy performed by Inocente Rocha MD at [facility]    HX COLONOSCOPY      HX CORONARY ARTERY BYPASS GRAFT         Social History     Tobacco Use    Smoking status: Former     Packs/day: 0.25     Types: Cigarettes     Quit date: 2005     Years since quittin.6    Smokeless tobacco: Never   Substance Use Topics    Alcohol use: No       No Known Allergies    Current Outpatient Medications   Medication Sig    atorvastatin (LIPITOR) 20 mg tablet TAKE 1 TABLET BY MOUTH EVERY NIGHT AT BEDTIME FOR CHOLESTEROL    clopidogreL (PLAVIX) 75 mg tab TAKE 1 TABLET BY MOUTH DAILY    amLODIPine (NORVASC) 5 mg tablet TAKE 1 TABLET BY MOUTH DAILY    metoprolol tartrate (LOPRESSOR) 50 mg tablet TAKE 1 TABLET BY MOUTH TWICE DAILY    Trulicity 1.5 TL/6.9 mL sub-q pen     tadalafiL (CIALIS) 20 mg tablet TAKE 1 TABLET DAILY AS     NEEDED    brimonidine-timoloL (COMBIGAN) 0.2-0.5 % drop ophthalmic solution INSTILL 1 DROP IN BOTH EYES TWICE DAILY    Accu-Chek [place] Plus test strp strip USE TWICE DAILY    brinzolamide (AZOPT) 1 % ophthalmic suspension SHAKE LIQUID AND INSTILL 1 DROP IN BOTH EYES TWICE DAILY    bimatoprost (LUMIGAN) 0.01 % ophthalmic drops 1 Drop nightly. losartan (COZAAR) 50 mg tablet Take  by mouth daily. levomefolate/B6/B12/algal oil (L-METHYLFOLATE CA P-5-P ME-CBL PO) Take  by mouth.    canagliflozin (INVOKANA) 100 mg tablet Take 100 mg by mouth Daily (before breakfast).     insulin glargine (LANTUS) 100 unit/mL injection by SubCUTAneous route nightly. omega-3 fatty acids-vitamin e 1,000 mg cap Take 1 Cap by mouth.    metFORMIN (GLUCOPHAGE) 1,000 mg tablet Take 1,000 mg by mouth two (2) times daily (with meals). cholecalciferol, vitamin D3, 50 mcg (2,000 unit) tab Take  by mouth daily. No current facility-administered medications for this visit. Visit Vitals  /73 (BP 1 Location: Left upper arm, BP Patient Position: Sitting, BP Cuff Size: Small adult)   Pulse 70   Ht 6' 1\" (1.854 m)   Wt 93.9 kg (207 lb)   SpO2 97%   BMI 27.31 kg/m²         Physical Exam  Constitutional:       Appearance: He is well-developed. HENT:      Head: Normocephalic and atraumatic. Eyes:      Conjunctiva/sclera: Conjunctivae normal.   Neck:      Thyroid: No thyromegaly. Vascular: No JVD. Trachea: No tracheal deviation. Cardiovascular:      Rate and Rhythm: Normal rate and regular rhythm. Heart sounds: Murmur heard. Early systolic murmur is present at the upper right sternal border. No friction rub. No gallop. Pulmonary:      Effort: No respiratory distress. Breath sounds: Normal breath sounds. No wheezing or rales. Chest:      Chest wall: No tenderness. Abdominal:      Palpations: Abdomen is soft. Tenderness: There is no abdominal tenderness. Musculoskeletal:      Cervical back: Neck supple. Skin:     General: Skin is warm and dry. Neurological:      Mental Status: He is alert and oriented to person, place, and time. Mr. George Khan has a reminder for a \"due or due soon\" health maintenance. I have asked that he contact his primary care provider for follow-up on this health maintenance. No flowsheet data found. CARDIOLOGY STUDIES 6/2/2011 3/27/2009   Myocardial Perfusion Scan Result - abn scan, small reversible ischemia in Basal part of inferior wall, EF 63%   Cardiac Cath with PCI Result - EF 60%;  Triple Bypass 4/09   Echocardiogram - Complete Result MILD Mehrdad Garcia EF,DD -          Diagnosis: 2014  Positive EKG portion of exercise stress test with inferior and lateral ST depression. Shortness of breath and reduced functional tolerance with exercise. Nuclear imaging report to follow. NUCLEAR IMAGIN2014     Findings:   1. Post-stress imaging in short axis, horizontal and vertical long axis views reveals normal isotope uptake in all areas. 2. Resting images also show normal isotope uptake in all areas. 3. Gated images show normal left ventricular size, wall motion and systolic function. Ejection fraction is 75%. Diagnosis:   1. Normal scan. 2. No evidence of significant fixed or reversible defect suggesting ischemia or myocardial infarction noted from this nuclear study. I Have personally reviewed recent relevant labs available and discussed with patient  2017-lipid  2018-lipid,lft  2019-lipid  2020 - lipids - LDL 67  2020-BMP      Procedure Conclusion 2021    Nuclear Stress Test    Nuclear Cardiac Spect Rest then Gated Stress study. Exercise was used as the stressing method and agent. One day myocardial perfusion study. Date: 2021. Left ventricular perfusion is normal. Myocardial perfusion imaging supports a low risk stress test.   There is a prior study available for comparison. . As compared to the previous study, there are no significant changes     Interpretation Summary 2021    LV: Estimated LVEF is 60 - 65%. Normal cavity size and systolic function (ejection fraction normal). Moderate concentric hypertrophy. Wall motion: normal. Mild (grade 1) left ventricular diastolic dysfunction Moderate (grade 2) left ventricular diastolic dysfunction. LA: Left Atrium volume index is 29.98 mL/m2. RV: Normal right ventricular size and function. No hemodynamically significant valvular pathology.       Ref Range & Units 2 mo ago 2021   Cholesterol 110 - 200 mg/dL 137        Triglyceride 40 - 149 mg/dL 135        HDL >=40 mg/dL 38Low         Cholesterol/HDL 0.0 - 5.0  3.6        Non-HDL Cholesterol <130 mg/dL 99        LDL CALCULATION 50 - 99 mg/dL 72        VLDL CALCULATION 8 - 30 mg/dL 27        LDL/HDL Ratio   1.9      I Have personally reviewed recent relevant labs available and discussed with patient  2/2022-CMP, hemoglobin A1c  8/2022-CMP, hemoglobin A1c, CBC    Assessment       ICD-10-CM ICD-9-CM    1. Atherosclerosis of native coronary artery of native heart without angina pectoris  I25.10 414.01 LIPID PANEL      HEPATIC FUNCTION PANEL    Stable continue treatment monitor      2. Postsurgical aortocoronary bypass status  Z95.1 V45.81     Stable monitor      3. Hyperlipidemia, unspecified hyperlipidemia type  E78.5 272.4     Continue treatment lab with PCP      4. Essential hypertension, benign  I10 401.1     Stable continue treatment      2/2020  Stable cardiac status continue current medical management. Lab follow-up with PCP  8/2020  Cardiac status stable. Recent labs reviewed, Lipids controlled. 2/2021  Recent increase shortness of breath. Rule out ischemia or LV dysfunction. Set up for stress test and echo  2/26/2021  Continue monitoring clinically normal ejection fraction no significant valvular dysfunction. Negative stress test.  Will monitor shortness of breath clinically and monitor blood pressure. Lab with PCP  9/2021  Cardiac status stable. Blood pressure controlled. Symptoms are controlled. Continue treatment lab review  3/2022  Stable cardiac status continue current medical management. 9/2022  Stable cardiac status continue current medical management. Now on atorvastatin 20 mg a day we will follow-up labs.   Medications Discontinued During This Encounter   Medication Reason    simvastatin (ZOCOR) 20 mg tablet Not A Current Medication       Orders Placed This Encounter    LIPID PANEL     Standing Status:   Future     Standing Expiration Date:   3/8/2023    HEPATIC FUNCTION PANEL     Standing Status: Future     Standing Expiration Date:   3/8/2023       Follow-up and Dispositions    Return in about 6 months (around 3/8/2023).

## 2022-09-15 LAB
A-G RATIO,AGRAT: 1.6 RATIO (ref 1.1–2.6)
ALBUMIN SERPL-MCNC: 4.5 G/DL (ref 3.5–5)
ALP SERPL-CCNC: 61 U/L (ref 40–125)
ALT SERPL-CCNC: 22 U/L (ref 5–40)
AST SERPL W P-5'-P-CCNC: 18 U/L (ref 10–37)
BILIRUB SERPL-MCNC: 0.7 MG/DL (ref 0.2–1.2)
BILIRUBIN, DIRECT,CBIL: <0.2 MG/DL (ref 0–0.3)
CHOLEST SERPL-MCNC: 129 MG/DL (ref 110–200)
GLOBULIN,GLOB: 2.8 G/DL (ref 2–4)
HDLC SERPL-MCNC: 3.2 MG/DL (ref 0–5)
HDLC SERPL-MCNC: 40 MG/DL
LDL/HDL RATIO,LDHD: 1.8
LDLC SERPL CALC-MCNC: 70 MG/DL (ref 50–99)
NON-HDL CHOLESTEROL, 011976: 89 MG/DL
PROT SERPL-MCNC: 7.3 G/DL (ref 6.2–8.1)
TRIGL SERPL-MCNC: 95 MG/DL (ref 40–149)
VLDLC SERPL CALC-MCNC: 19 MG/DL (ref 8–30)

## 2022-09-16 ENCOUNTER — TELEPHONE (OUTPATIENT)
Dept: CARDIOLOGY CLINIC | Age: 72
End: 2022-09-16

## 2022-09-16 NOTE — TELEPHONE ENCOUNTER
----- Message from Toyin Soler MD sent at 9/16/2022  7:27 AM EDT -----  Lab/test  reviewed  No significant abnormality

## 2023-02-28 DIAGNOSIS — I10 ESSENTIAL (PRIMARY) HYPERTENSION: Primary | ICD-10-CM

## 2023-02-28 RX ORDER — AMLODIPINE BESYLATE 5 MG/1
5 TABLET ORAL DAILY
Qty: 90 TABLET | Refills: 3 | Status: SHIPPED | OUTPATIENT
Start: 2023-02-28

## 2023-02-28 NOTE — TELEPHONE ENCOUNTER
Requested Prescriptions     Pending Prescriptions Disp Refills    amLODIPine (NORVASC) 5 MG tablet 90 tablet 3     Sig: Take 1 tablet by mouth daily

## 2023-04-18 ENCOUNTER — OFFICE VISIT (OUTPATIENT)
Age: 73
End: 2023-04-18

## 2023-04-18 VITALS
HEART RATE: 55 BPM | OXYGEN SATURATION: 100 % | HEIGHT: 73 IN | SYSTOLIC BLOOD PRESSURE: 119 MMHG | DIASTOLIC BLOOD PRESSURE: 69 MMHG | WEIGHT: 210 LBS | BODY MASS INDEX: 27.83 KG/M2

## 2023-04-18 DIAGNOSIS — I25.10 ATHEROSCLEROSIS OF NATIVE CORONARY ARTERY OF NATIVE HEART WITHOUT ANGINA PECTORIS: Primary | ICD-10-CM

## 2023-04-18 DIAGNOSIS — I10 ESSENTIAL (PRIMARY) HYPERTENSION: ICD-10-CM

## 2023-04-18 DIAGNOSIS — E78.5 HYPERLIPIDEMIA, UNSPECIFIED HYPERLIPIDEMIA TYPE: ICD-10-CM

## 2023-04-18 DIAGNOSIS — Z95.1 PRESENCE OF AORTOCORONARY BYPASS GRAFT: ICD-10-CM

## 2023-04-18 PROCEDURE — 3078F DIAST BP <80 MM HG: CPT | Performed by: INTERNAL MEDICINE

## 2023-04-18 PROCEDURE — 99214 OFFICE O/P EST MOD 30 MIN: CPT | Performed by: INTERNAL MEDICINE

## 2023-04-18 PROCEDURE — 3074F SYST BP LT 130 MM HG: CPT | Performed by: INTERNAL MEDICINE

## 2023-04-18 PROCEDURE — 1123F ACP DISCUSS/DSCN MKR DOCD: CPT | Performed by: INTERNAL MEDICINE

## 2023-04-18 RX ORDER — ATORVASTATIN CALCIUM 40 MG/1
40 TABLET, FILM COATED ORAL DAILY
Qty: 90 TABLET | Refills: 3 | Status: SHIPPED | OUTPATIENT
Start: 2023-04-18

## 2023-04-18 RX ORDER — EMPAGLIFLOZIN 10 MG/1
TABLET, FILM COATED ORAL
COMMUNITY
Start: 2023-02-28

## 2023-04-18 ASSESSMENT — PATIENT HEALTH QUESTIONNAIRE - PHQ9
SUM OF ALL RESPONSES TO PHQ9 QUESTIONS 1 & 2: 0
SUM OF ALL RESPONSES TO PHQ QUESTIONS 1-9: 0
DEPRESSION UNABLE TO ASSESS: FUNCTIONAL CAPACITY MOTIVATION LIMITS ACCURACY
1. LITTLE INTEREST OR PLEASURE IN DOING THINGS: 0
2. FEELING DOWN, DEPRESSED OR HOPELESS: 0
SUM OF ALL RESPONSES TO PHQ QUESTIONS 1-9: 0

## 2023-04-18 NOTE — PROGRESS NOTES
1. Have you been to the ER, urgent care clinic since your last visit? Hospitalized since your last visit? No    2. Have you seen or consulted any other health care providers outside of the [address] [place] [address] since your last visit? Include any pap smears or colon screening.       No
monitoring clinically normal ejection fraction no significant valvular dysfunction. Negative stress test.  Will monitor shortness of breath clinically and monitor blood pressure. Lab with PCP  9/2021  Cardiac status stable. Blood pressure controlled. Symptoms are controlled. Continue treatment lab review  3/2022  Stable cardiac status continue current medical management. 9/2022  Stable cardiac status continue current medical management. Now on atorvastatin 20 mg a day we will follow-up labs. 4/2023  Cardiac status remains stable continue current medical management monitor. LDL in 80s today which is gone up on last lab work 223. Increase atorvastatin to 40 mg a day.

## 2023-04-24 DIAGNOSIS — I10 ESSENTIAL (PRIMARY) HYPERTENSION: Primary | ICD-10-CM

## 2023-04-24 RX ORDER — METOPROLOL TARTRATE 50 MG/1
50 TABLET, FILM COATED ORAL 2 TIMES DAILY
Qty: 90 TABLET | Refills: 3 | Status: SHIPPED | OUTPATIENT
Start: 2023-04-24

## 2023-04-24 NOTE — TELEPHONE ENCOUNTER
Requested Prescriptions     Pending Prescriptions Disp Refills    metoprolol tartrate (LOPRESSOR) 50 MG tablet 90 tablet 3     Sig: Take 1 tablet by mouth 2 times daily

## 2023-05-15 DIAGNOSIS — I10 ESSENTIAL (PRIMARY) HYPERTENSION: ICD-10-CM

## 2023-05-15 RX ORDER — METOPROLOL TARTRATE 50 MG/1
TABLET, FILM COATED ORAL
Qty: 180 TABLET | Refills: 1 | Status: SHIPPED | OUTPATIENT
Start: 2023-05-15

## 2023-06-21 DIAGNOSIS — I25.10 ATHEROSCLEROSIS OF NATIVE CORONARY ARTERY OF NATIVE HEART WITHOUT ANGINA PECTORIS: Primary | ICD-10-CM

## 2023-06-21 RX ORDER — CLOPIDOGREL BISULFATE 75 MG/1
75 TABLET ORAL DAILY
Qty: 90 TABLET | Refills: 3 | Status: SHIPPED | OUTPATIENT
Start: 2023-06-21

## 2023-09-26 RX ORDER — NETARSUDIL AND LATANOPROST OPHTHALMIC SOLUTION, 0.02%/0.005% .2; .05 MG/ML; MG/ML
SOLUTION/ DROPS OPHTHALMIC; TOPICAL
COMMUNITY
Start: 2022-04-21

## 2023-09-26 NOTE — PERIOP NOTE
Pressure/Heart medications. Any questions regarding prep, please call the office at [phone]. For any questions or concerns on the day of procedure, please call the Endo Suite at [phone]. These surgical instructions were reviewed with patient during the PAT phone call.

## 2023-10-02 ENCOUNTER — OFFICE VISIT (OUTPATIENT)
Age: 73
End: 2023-10-02
Payer: MEDICARE

## 2023-10-02 VITALS
HEIGHT: 73 IN | BODY MASS INDEX: 27.04 KG/M2 | WEIGHT: 204 LBS | OXYGEN SATURATION: 97 % | SYSTOLIC BLOOD PRESSURE: 119 MMHG | HEART RATE: 60 BPM | DIASTOLIC BLOOD PRESSURE: 61 MMHG

## 2023-10-02 DIAGNOSIS — I25.10 ATHEROSCLEROSIS OF NATIVE CORONARY ARTERY OF NATIVE HEART WITHOUT ANGINA PECTORIS: Primary | ICD-10-CM

## 2023-10-02 DIAGNOSIS — Z95.1 PRESENCE OF AORTOCORONARY BYPASS GRAFT: ICD-10-CM

## 2023-10-02 DIAGNOSIS — I10 ESSENTIAL (PRIMARY) HYPERTENSION: ICD-10-CM

## 2023-10-02 DIAGNOSIS — E78.5 HYPERLIPIDEMIA, UNSPECIFIED HYPERLIPIDEMIA TYPE: ICD-10-CM

## 2023-10-02 PROCEDURE — 3078F DIAST BP <80 MM HG: CPT | Performed by: INTERNAL MEDICINE

## 2023-10-02 PROCEDURE — 3017F COLORECTAL CA SCREEN DOC REV: CPT | Performed by: INTERNAL MEDICINE

## 2023-10-02 PROCEDURE — G8417 CALC BMI ABV UP PARAM F/U: HCPCS | Performed by: INTERNAL MEDICINE

## 2023-10-02 PROCEDURE — 1036F TOBACCO NON-USER: CPT | Performed by: INTERNAL MEDICINE

## 2023-10-02 PROCEDURE — 1123F ACP DISCUSS/DSCN MKR DOCD: CPT | Performed by: INTERNAL MEDICINE

## 2023-10-02 PROCEDURE — 99214 OFFICE O/P EST MOD 30 MIN: CPT | Performed by: INTERNAL MEDICINE

## 2023-10-02 PROCEDURE — 3074F SYST BP LT 130 MM HG: CPT | Performed by: INTERNAL MEDICINE

## 2023-10-02 PROCEDURE — G8484 FLU IMMUNIZE NO ADMIN: HCPCS | Performed by: INTERNAL MEDICINE

## 2023-10-02 PROCEDURE — G8427 DOCREV CUR MEDS BY ELIG CLIN: HCPCS | Performed by: INTERNAL MEDICINE

## 2023-10-02 ASSESSMENT — PATIENT HEALTH QUESTIONNAIRE - PHQ9
SUM OF ALL RESPONSES TO PHQ9 QUESTIONS 1 & 2: 0
2. FEELING DOWN, DEPRESSED OR HOPELESS: 0
SUM OF ALL RESPONSES TO PHQ QUESTIONS 1-9: 0
1. LITTLE INTEREST OR PLEASURE IN DOING THINGS: 0
SUM OF ALL RESPONSES TO PHQ QUESTIONS 1-9: 0
DEPRESSION UNABLE TO ASSESS: FUNCTIONAL CAPACITY MOTIVATION LIMITS ACCURACY
SUM OF ALL RESPONSES TO PHQ QUESTIONS 1-9: 0
SUM OF ALL RESPONSES TO PHQ QUESTIONS 1-9: 0

## 2023-10-02 NOTE — PROGRESS NOTES
1. Have you been to the ER, urgent care clinic since your last visit? Hospitalized since your last visit? No    2. Have you seen or consulted any other health care providers outside of the [address] [place] [address] since your last visit? Include any pap smears or colon screening.       No

## 2023-10-02 NOTE — PROGRESS NOTES
HISTORY OF PRESENT ILLNESS  Rob Rosales is a 67 y.o. male. Patient with cad,post cabg,htn,dm. On follow up patient denies any chest pains,sob, palpitation or other significant symptoms. 2/2021  Patient is here for follow-up. Patient is complain of shortness of breath on exertion that is new. Denies orthopnea. He denies any chest pain  9/2021  Patient is here for follow-up. His shortness of breath is stable. Denies any chest pain. Denies any other significant symptoms  3/2022  Patient seen and for follow-up. He has shortness of breath on exertion stable pattern. No chest pain    Follow-up  Associated symptoms include shortness of breath. Pertinent negatives include no chest pain. Hypertension  The history is provided by the Patient. This is a chronic problem. The problem occurs constantly. The problem has not changed since onset. Associated symptoms include shortness of breath. Pertinent negatives include no chest pain. Cholesterol Problem  The history is provided by the Patient. This is a chronic problem. The problem occurs constantly. The problem has not changed since onset. Associated symptoms include shortness of breath. Pertinent negatives include no chest pain. Review of Systems   Constitutional:  Negative for chills and fever. HENT:  Negative for nosebleeds. Eyes:  Negative for blurred vision and double vision. Respiratory:  Positive for shortness of breath. Negative for cough, hemoptysis, sputum production and wheezing. Cardiovascular:  Negative for chest pain, palpitations, orthopnea, claudication, leg swelling and PND. Gastrointestinal:  Negative for heartburn, nausea and vomiting. Musculoskeletal:  Negative for myalgias. Skin:  Negative for rash. Neurological:  Negative for dizziness and weakness. Endo/Heme/Allergies:  Does not bruise/bleed easily. No family history on file.     Past Medical History:   Diagnosis Date    Coronary atherosclerosis of native coronary

## 2023-10-03 DIAGNOSIS — I10 ESSENTIAL (PRIMARY) HYPERTENSION: ICD-10-CM

## 2023-10-03 RX ORDER — METOPROLOL TARTRATE 50 MG/1
50 TABLET, FILM COATED ORAL 2 TIMES DAILY
Qty: 90 TABLET | Refills: 7 | Status: SHIPPED | OUTPATIENT
Start: 2023-10-03

## 2023-10-06 ENCOUNTER — ANESTHESIA EVENT (OUTPATIENT)
Facility: HOSPITAL | Age: 73
End: 2023-10-06
Payer: MEDICARE

## 2023-10-09 ENCOUNTER — HOSPITAL ENCOUNTER (OUTPATIENT)
Facility: HOSPITAL | Age: 73
Setting detail: OUTPATIENT SURGERY
Discharge: HOME | End: 2023-10-09
Attending: INTERNAL MEDICINE | Admitting: INTERNAL MEDICINE
Payer: MEDICARE

## 2023-10-09 ENCOUNTER — ANESTHESIA (OUTPATIENT)
Facility: HOSPITAL | Age: 73
End: 2023-10-09
Payer: MEDICARE

## 2023-10-09 VITALS
HEIGHT: 72 IN | TEMPERATURE: 97.7 F | BODY MASS INDEX: 26.68 KG/M2 | DIASTOLIC BLOOD PRESSURE: 57 MMHG | RESPIRATION RATE: 17 BRPM | SYSTOLIC BLOOD PRESSURE: 102 MMHG | WEIGHT: 197 LBS | HEART RATE: 54 BPM | OXYGEN SATURATION: 96 %

## 2023-10-09 LAB — GLUCOSE BLD STRIP.AUTO-MCNC: 73 MG/DL (ref 70–110)

## 2023-10-09 PROCEDURE — 7100000011 HC PHASE II RECOVERY - ADDTL 15 MIN: Performed by: INTERNAL MEDICINE

## 2023-10-09 PROCEDURE — 88305 TISSUE EXAM BY PATHOLOGIST: CPT

## 2023-10-09 PROCEDURE — 82962 GLUCOSE BLOOD TEST: CPT

## 2023-10-09 PROCEDURE — 7100000000 HC PACU RECOVERY - FIRST 15 MIN: Performed by: INTERNAL MEDICINE

## 2023-10-09 PROCEDURE — 6360000002 HC RX W HCPCS: Performed by: NURSE ANESTHETIST, CERTIFIED REGISTERED

## 2023-10-09 PROCEDURE — 7100000010 HC PHASE II RECOVERY - FIRST 15 MIN: Performed by: INTERNAL MEDICINE

## 2023-10-09 PROCEDURE — 2709999900 HC NON-CHARGEABLE SUPPLY: Performed by: INTERNAL MEDICINE

## 2023-10-09 PROCEDURE — 3600007502: Performed by: INTERNAL MEDICINE

## 2023-10-09 PROCEDURE — 3700000001 HC ADD 15 MINUTES (ANESTHESIA): Performed by: INTERNAL MEDICINE

## 2023-10-09 PROCEDURE — 2500000003 HC RX 250 WO HCPCS: Performed by: NURSE ANESTHETIST, CERTIFIED REGISTERED

## 2023-10-09 PROCEDURE — 3600007512: Performed by: INTERNAL MEDICINE

## 2023-10-09 PROCEDURE — 3700000000 HC ANESTHESIA ATTENDED CARE: Performed by: INTERNAL MEDICINE

## 2023-10-09 PROCEDURE — 2580000003 HC RX 258: Performed by: NURSE ANESTHETIST, CERTIFIED REGISTERED

## 2023-10-09 RX ORDER — INSULIN LISPRO 100 [IU]/ML
0-12 INJECTION, SOLUTION INTRAVENOUS; SUBCUTANEOUS ONCE
Status: DISCONTINUED | OUTPATIENT
Start: 2023-10-09 | End: 2023-10-09 | Stop reason: HOSPADM

## 2023-10-09 RX ORDER — SODIUM CHLORIDE 9 MG/ML
INJECTION, SOLUTION INTRAVENOUS PRN
Status: DISCONTINUED | OUTPATIENT
Start: 2023-10-09 | End: 2023-10-09 | Stop reason: HOSPADM

## 2023-10-09 RX ORDER — PROPOFOL 10 MG/ML
INJECTION, EMULSION INTRAVENOUS PRN
Status: DISCONTINUED | OUTPATIENT
Start: 2023-10-09 | End: 2023-10-09 | Stop reason: SDUPTHER

## 2023-10-09 RX ORDER — EPHEDRINE SULFATE/0.9% NACL/PF 50 MG/5 ML
SYRINGE (ML) INTRAVENOUS PRN
Status: DISCONTINUED | OUTPATIENT
Start: 2023-10-09 | End: 2023-10-09 | Stop reason: SDUPTHER

## 2023-10-09 RX ORDER — FAMOTIDINE 20 MG/1
20 TABLET, FILM COATED ORAL ONCE
Status: DISCONTINUED | OUTPATIENT
Start: 2023-10-09 | End: 2023-10-09 | Stop reason: HOSPADM

## 2023-10-09 RX ORDER — SODIUM CHLORIDE, SODIUM LACTATE, POTASSIUM CHLORIDE, CALCIUM CHLORIDE 600; 310; 30; 20 MG/100ML; MG/100ML; MG/100ML; MG/100ML
INJECTION, SOLUTION INTRAVENOUS CONTINUOUS
Status: DISCONTINUED | OUTPATIENT
Start: 2023-10-09 | End: 2023-10-09 | Stop reason: HOSPADM

## 2023-10-09 RX ORDER — LIDOCAINE HYDROCHLORIDE 20 MG/ML
INJECTION, SOLUTION EPIDURAL; INFILTRATION; INTRACAUDAL; PERINEURAL PRN
Status: DISCONTINUED | OUTPATIENT
Start: 2023-10-09 | End: 2023-10-09 | Stop reason: SDUPTHER

## 2023-10-09 RX ORDER — SODIUM CHLORIDE 0.9 % (FLUSH) 0.9 %
5-40 SYRINGE (ML) INJECTION EVERY 12 HOURS SCHEDULED
Status: DISCONTINUED | OUTPATIENT
Start: 2023-10-09 | End: 2023-10-09 | Stop reason: HOSPADM

## 2023-10-09 RX ORDER — SODIUM CHLORIDE 0.9 % (FLUSH) 0.9 %
5-40 SYRINGE (ML) INJECTION PRN
Status: DISCONTINUED | OUTPATIENT
Start: 2023-10-09 | End: 2023-10-09 | Stop reason: HOSPADM

## 2023-10-09 RX ORDER — DEXTROSE MONOHYDRATE 100 MG/ML
INJECTION, SOLUTION INTRAVENOUS CONTINUOUS PRN
Status: DISCONTINUED | OUTPATIENT
Start: 2023-10-09 | End: 2023-10-09 | Stop reason: HOSPADM

## 2023-10-09 RX ADMIN — LIDOCAINE HYDROCHLORIDE 20 MG: 20 INJECTION, SOLUTION EPIDURAL; INFILTRATION; INTRACAUDAL; PERINEURAL at 07:44

## 2023-10-09 RX ADMIN — Medication 10 MG: at 07:56

## 2023-10-09 RX ADMIN — PROPOFOL 50 MG: 10 INJECTION, EMULSION INTRAVENOUS at 07:46

## 2023-10-09 RX ADMIN — Medication 10 MG: at 07:50

## 2023-10-09 RX ADMIN — SODIUM CHLORIDE, SODIUM LACTATE, POTASSIUM CHLORIDE, AND CALCIUM CHLORIDE: 600; 310; 30; 20 INJECTION, SOLUTION INTRAVENOUS at 07:39

## 2023-10-09 RX ADMIN — PROPOFOL 50 MG: 10 INJECTION, EMULSION INTRAVENOUS at 07:51

## 2023-10-09 RX ADMIN — PROPOFOL 150 MG: 10 INJECTION, EMULSION INTRAVENOUS at 07:44

## 2023-10-09 NOTE — H&P
[phone]    Gastroenterology pre op History and Physical     Impression:   1. High risk colon cancer screening/Colon polyp surveillance exam      Plan:     1. Colonoscopy    Addendum: All lab tests orders pertaining to the procedure have been ordered by the anesthesia personnel and results will be addressed by the anesthesia team      Chief Complaint: high risk colon cancer screening exam.      HPI:  Olamide Jordan is a 68 y.o. male who is being seen on consult for high risk colon cancer screening with colonoscopy.  There is a previous history of colon polyps, and the patient returns for a surveillence exam    PMH:   Past Medical History:   Diagnosis Date    Coronary atherosclerosis of native coronary artery     Stable, now post CABG    Essential hypertension, benign     stable    Other and unspecified hyperlipidemia     LDL less than 100, HDL 37, LFT normal    Postsurgical aortocoronary bypass status     PUD (peptic ulcer disease)     h/o bleeding duodenal ulcer in     Type II or unspecified type diabetes mellitus without mention of complication, not stated as uncontrolled 11/15/2013       PSH:   Past Surgical History:   Procedure Laterality Date    COLONOSCOPY      COLONOSCOPY N/A 10/30/2020    COLONOSCOPY with polypectomy performed by Shelly Goldman MD at [address] [place] [address]         Social HX:   Social History     Socioeconomic History    Marital status:      Spouse name: Not on file    Number of children: Not on file    Years of education: Not on file    Highest education level: Not on file   Occupational History    Not on file   Tobacco Use    Smoking status: Former     Packs/day: .25     Types: Cigarettes     Quit date: 2005     Years since quittin.7    Smokeless tobacco: Never   Vaping Use    Vaping Use: Never used   Substance and Sexual Activity    Alcohol use: No    Drug use: Never    Sexual activity: Not on file   Other Topics Concern    Not

## 2023-10-09 NOTE — ANESTHESIA POSTPROCEDURE EVALUATION
Department of Anesthesiology  Postprocedure Note    Patient: Pj Olguin  MRN: [id number]  YOB: 1950  Date of evaluation: 10/9/2023      Procedure Summary     Date: 10/09/23 Room / Location: [facility] ENDO 03 / [facility]    Anesthesia Start: 1317 Anesthesia Stop: 8886    Procedure: COLONOSCOPY (Abdomen) Diagnosis:       Polyp of colon, unspecified part of colon, unspecified type      Long term (current) use of antithrombotics/antiplatelets      Overweight (BMI 25.0-29. 9)      (Polyp of colon, unspecified part of colon, unspecified type [K63.5])      (Long term (current) use of antithrombotics/antiplatelets [O61.83])      (Overweight (BMI 25.0-29. 9) [E66.3])    Surgeons: Chetan Wright MD Responsible Provider: Dalia Gaona MD    Anesthesia Type: MAC ASA Status: 3          Anesthesia Type: MAC    Carina Phase I: Carina Score: 10    Carina Phase II: Carina Score: 10      Anesthesia Post Evaluation    Patient location during evaluation: bedside  Patient participation: complete - patient participated  Airway patency: patent  Complications: no  Cardiovascular status: hemodynamically stable  Respiratory status: acceptable  Hydration status: stable

## 2023-10-09 NOTE — BRIEF OP NOTE
[address] [place] [address], [address] [place] [address]      Brief Procedure Note    Nirmala Blackwood  1950  [id number]    Date of Procedure: 10/9/2023     Preoperative diagnosis: Polyp of colon, unspecified part of colon, unspecified type [K63.5]  Long term (current) use of antithrombotics/antiplatelets [H36.41]  Overweight (BMI 25.0-29. 9) [E66.3]    Postoperative diagnosis: Polyp of colon, unspecified part of colon, unspecified type [K63.5]  Long term (current) use of antithrombotics/antiplatelets [P85.58]  Overweight (BMI 25.0-29. 9) [E66.3]    Type of Anesthesia: MAC (Monitored anesthesia care)    Description of findings: same as post op dx    Procedure: Procedure(s):  COLONOSCOPY    :  Dr. Melanie Bertrand MD    Assistant(s): Perioperative Nurse: Raymond Skiff, RN; Braulio Triplett RN    Devices/implants/grafts/tissues/prosthesis: None    EBL:None    Specimens:   ID Type Source Tests Collected by Time Destination   1 : ascending polypx3 Tissue Colon-Ascending SURGICAL PATHOLOGY Melanie Bertrand MD 10/9/2023 0431    2 : transverse polyp Tissue Colon-Transverse SURGICAL PATHOLOGY Melanie Bertrand MD 10/9/2023 8345        Findings: See printed and scanned procedure note    Complications: None    Dr. Melanie Bertrand MD  10/9/2023  8:10 AM

## 2023-10-09 NOTE — DISCHARGE INSTRUCTIONS
Colonoscopy: What to Expect at [address]  After a colonoscopy, you'll stay at the clinic until you wake up. Then you can go home. But you'll need to arrange for a ride. Your doctor will tell you when you can eat and do your other usual activities. Your doctor will talk to you about when you'll need your next colonoscopy. Your doctor can help you decide how often you need to be checked. This will depend on the results of your test and your risk for colorectal cancer. After the test, you may be bloated or have gas pains. You may need to pass gas. If a biopsy was done or a polyp was removed, you may have streaks of blood in your stool (feces) for a few days. Problems such as heavy rectal bleeding may not occur until several weeks after the test. This isn't common. But it can happen after polyps are removed. This care sheet gives you a general idea about how long it will take for you to recover. But each person recovers at a different pace. Follow the steps below to get better as quickly as possible. How can you care for yourself at home? Activity    Rest when you feel tired. You can do your normal activities when it feels okay to do so. Diet    Follow your doctor's directions for eating. Unless your doctor has told you not to, drink plenty of fluids. This helps to replace the fluids that were lost during the colon prep. Do not drink alcohol. Medicines    Your doctor will tell you if and when you can restart your medicines. You will also be given instructions about taking any new medicines. If you take aspirin or some other blood thinner, ask your doctor if and when to start taking it again. Make sure that you understand exactly what your doctor wants you to do. If polyps were removed or a biopsy was done during the test, your doctor may tell you not to take aspirin or other anti-inflammatory medicines for a few days. These include ibuprofen (Advil, Motrin) and naproxen (Aleve).

## 2023-10-09 NOTE — PROGRESS NOTES
Resume Plavix today. Cecy Young MD  [facility]  Phone: [phone]  Pager: [phone]  Cell: [phone].

## 2023-10-09 NOTE — ANESTHESIA PRE PROCEDURE
Department of Anesthesiology  Preprocedure Note       Name:  Laura Talavera   Age:  68 y.o.  :  1950                                          MRN:  [id number]         Date:  10/9/2023      Surgeon: Laura Boyce):  Sosa Flores MD    Procedure: Procedure(s):  COLONOSCOPY    Medications prior to admission:   Prior to Admission medications    Medication Sig Start Date End Date Taking?  Authorizing Provider   netarsudil-Latanoprost (ROCKLATAN) 0.02-0.005 % ophthalmic solution INSTILL 1 DROP IN BOTH EYES EVERY NIGHT AT BEDTIME 22  Yes Provider, MD Donya   metoprolol tartrate (LOPRESSOR) 50 MG tablet TAKE 1 TABLET TWICE A DAY 10/3/23   Mackenzie Warner APRN - NP   clopidogrel (PLAVIX) 75 MG tablet Take 1 tablet by mouth daily 23   Elida Torres MD   empagliflozin (JARDIANCE) 25 MG tablet Take 1 tablet by mouth daily 23   ProviderDonya MD   atorvastatin (LIPITOR) 40 MG tablet Take 1 tablet by mouth daily 23   Eugene Phelan MD   amLODIPine (NORVASC) 5 MG tablet Take 1 tablet by mouth daily 23   Domenica Warner APRN - NP   bimatoprost (LUMIGAN) 0.01 % SOLN ophthalmic drops 1 drop    Automatic Reconciliation, Ar   brimonidine-timolol (COMBIGAN) 0.2-0.5 % ophthalmic solution INSTILL 1 DROP IN BOTH EYES TWICE DAILY 3/1/22   Automatic Reconciliation, Ar   brinzolamide (AZOPT) 1 % ophthalmic suspension SHAKE LIQUID AND INSTILL 1 DROP IN BOTH EYES TWICE DAILY 21   Automatic Reconciliation, Ar   canagliflozin (INVOKANA) 100 MG TABS tablet Take 1 tablet by mouth every morning (before breakfast)    Automatic Reconciliation, Ar   Cholecalciferol 50 MCG (2000 UT) TABS Take by mouth daily    Automatic Reconciliation, Ar   insulin glargine (LANTUS) 100 UNIT/ML injection vial Inject into the skin    Automatic Reconciliation, Ar   losartan (COZAAR) 50 MG tablet Take by mouth daily    Automatic Reconciliation, Ar   metFORMIN (GLUCOPHAGE) 1000 MG tablet Take 1 tablet by mouth 2 times

## 2024-03-04 DIAGNOSIS — I10 ESSENTIAL (PRIMARY) HYPERTENSION: ICD-10-CM

## 2024-03-04 RX ORDER — AMLODIPINE BESYLATE 5 MG/1
5 TABLET ORAL DAILY
Qty: 90 TABLET | Refills: 3 | Status: SHIPPED | OUTPATIENT
Start: 2024-03-04

## 2024-03-26 RX ORDER — ATORVASTATIN CALCIUM 40 MG/1
40 TABLET, FILM COATED ORAL DAILY
Qty: 90 TABLET | Refills: 3 | Status: SHIPPED | OUTPATIENT
Start: 2024-03-26

## 2024-04-10 ENCOUNTER — OFFICE VISIT (OUTPATIENT)
Age: 74
End: 2024-04-10
Payer: MEDICARE

## 2024-04-10 VITALS
HEIGHT: 73 IN | WEIGHT: 202 LBS | HEART RATE: 65 BPM | DIASTOLIC BLOOD PRESSURE: 58 MMHG | OXYGEN SATURATION: 95 % | BODY MASS INDEX: 26.77 KG/M2 | SYSTOLIC BLOOD PRESSURE: 108 MMHG

## 2024-04-10 DIAGNOSIS — E11.9 TYPE 2 DIABETES MELLITUS WITHOUT COMPLICATION, WITHOUT LONG-TERM CURRENT USE OF INSULIN (HCC): ICD-10-CM

## 2024-04-10 DIAGNOSIS — I25.10 ATHEROSCLEROSIS OF NATIVE CORONARY ARTERY OF NATIVE HEART WITHOUT ANGINA PECTORIS: Primary | ICD-10-CM

## 2024-04-10 DIAGNOSIS — R01.1 MURMUR, CARDIAC: ICD-10-CM

## 2024-04-10 DIAGNOSIS — E78.00 PURE HYPERCHOLESTEROLEMIA: ICD-10-CM

## 2024-04-10 DIAGNOSIS — I10 ESSENTIAL HYPERTENSION, BENIGN: ICD-10-CM

## 2024-04-10 DIAGNOSIS — Z95.1 POSTSURGICAL AORTOCORONARY BYPASS STATUS: ICD-10-CM

## 2024-04-10 PROCEDURE — 1123F ACP DISCUSS/DSCN MKR DOCD: CPT | Performed by: INTERNAL MEDICINE

## 2024-04-10 PROCEDURE — G8417 CALC BMI ABV UP PARAM F/U: HCPCS | Performed by: INTERNAL MEDICINE

## 2024-04-10 PROCEDURE — 3078F DIAST BP <80 MM HG: CPT | Performed by: INTERNAL MEDICINE

## 2024-04-10 PROCEDURE — 3017F COLORECTAL CA SCREEN DOC REV: CPT | Performed by: INTERNAL MEDICINE

## 2024-04-10 PROCEDURE — 99214 OFFICE O/P EST MOD 30 MIN: CPT | Performed by: INTERNAL MEDICINE

## 2024-04-10 PROCEDURE — 93000 ELECTROCARDIOGRAM COMPLETE: CPT | Performed by: INTERNAL MEDICINE

## 2024-04-10 PROCEDURE — G8427 DOCREV CUR MEDS BY ELIG CLIN: HCPCS | Performed by: INTERNAL MEDICINE

## 2024-04-10 PROCEDURE — 2022F DILAT RTA XM EVC RTNOPTHY: CPT | Performed by: INTERNAL MEDICINE

## 2024-04-10 PROCEDURE — 3046F HEMOGLOBIN A1C LEVEL >9.0%: CPT | Performed by: INTERNAL MEDICINE

## 2024-04-10 PROCEDURE — 1036F TOBACCO NON-USER: CPT | Performed by: INTERNAL MEDICINE

## 2024-04-10 PROCEDURE — 3074F SYST BP LT 130 MM HG: CPT | Performed by: INTERNAL MEDICINE

## 2024-04-10 RX ORDER — AMLODIPINE BESYLATE 2.5 MG/1
2.5 TABLET ORAL DAILY
Qty: 90 TABLET | Refills: 3 | Status: SHIPPED | OUTPATIENT
Start: 2024-04-10

## 2024-04-10 ASSESSMENT — PATIENT HEALTH QUESTIONNAIRE - PHQ9
2. FEELING DOWN, DEPRESSED OR HOPELESS: NOT AT ALL
SUM OF ALL RESPONSES TO PHQ QUESTIONS 1-9: 0
1. LITTLE INTEREST OR PLEASURE IN DOING THINGS: NOT AT ALL
SUM OF ALL RESPONSES TO PHQ QUESTIONS 1-9: 0
SUM OF ALL RESPONSES TO PHQ9 QUESTIONS 1 & 2: 0
SUM OF ALL RESPONSES TO PHQ QUESTIONS 1-9: 0
SUM OF ALL RESPONSES TO PHQ QUESTIONS 1-9: 0

## 2024-04-10 ASSESSMENT — ENCOUNTER SYMPTOMS
EYES NEGATIVE: 1
GASTROINTESTINAL NEGATIVE: 1
RESPIRATORY NEGATIVE: 1

## 2024-04-10 NOTE — PATIENT INSTRUCTIONS
The medication list included in this document is our record of what you are currently taking, including any changes that were made at today's visit.  If you find any differences when compared to your medications at home, or have any questions that were not answered at your visit, please contact the office.    Please call if the blood pressure is more than 130/85 at home while resting

## 2024-04-10 NOTE — PROGRESS NOTES
True Camacho is a 73 y.o. year old male.    Patient with cad,post cabg, hypertension, hyperlipidemia  History of diabetes      2/2021  Patient is here for follow-up.  Patient is complain of shortness of breath on exertion that is new.  Denies orthopnea.  He denies any chest pain  9/2021  Patient is here for follow-up.  His shortness of breath is stable.  Denies any chest pain.  Denies any other significant symptoms  3/2022  Patient seen and for follow-up.  He has shortness of breath on exertion stable pattern.  No chest pain  4/10/2024 No significant chest pain, shortness of breath, edema, dizziness, palpitations or loss of consciousness.  Tries to walk a mile a day between his driving.            Review of Systems   Constitutional: Negative.    HENT: Negative.     Eyes: Negative.    Respiratory: Negative.     Cardiovascular: Negative.    Gastrointestinal: Negative.    Endocrine: Negative.    Genitourinary: Negative.    Musculoskeletal: Negative.    Neurological: Negative.    Psychiatric/Behavioral: Negative.     All other systems reviewed and are negative.        Physical Exam  Vitals and nursing note reviewed.   Constitutional:       Appearance: Normal appearance.   HENT:      Head: Normocephalic and atraumatic.      Nose: Nose normal.   Eyes:      Conjunctiva/sclera: Conjunctivae normal.   Cardiovascular:      Rate and Rhythm: Normal rate and regular rhythm.      Pulses: Normal pulses.      Heart sounds: Murmur heard.      High-pitched blowing midsystolic murmur is present with a grade of 3/6 at the upper left sternal border and apex.   Pulmonary:      Effort: Pulmonary effort is normal.      Breath sounds: Normal breath sounds.   Abdominal:      General: Bowel sounds are normal.      Palpations: Abdomen is soft.   Musculoskeletal:         General: Normal range of motion.      Right lower leg: No edema.      Left lower leg: No edema.   Skin:     General: Skin is warm and dry.   Neurological:      General: No

## 2024-05-30 DIAGNOSIS — I25.10 ATHEROSCLEROSIS OF NATIVE CORONARY ARTERY OF NATIVE HEART WITHOUT ANGINA PECTORIS: ICD-10-CM

## 2024-05-30 RX ORDER — CLOPIDOGREL BISULFATE 75 MG/1
75 TABLET ORAL DAILY
Qty: 90 TABLET | Refills: 3 | Status: SHIPPED | OUTPATIENT
Start: 2024-05-30

## 2024-08-13 DIAGNOSIS — I10 ESSENTIAL (PRIMARY) HYPERTENSION: ICD-10-CM

## 2024-08-13 RX ORDER — METOPROLOL TARTRATE 50 MG/1
50 TABLET, FILM COATED ORAL 2 TIMES DAILY
Qty: 180 TABLET | Refills: 1 | Status: SHIPPED | OUTPATIENT
Start: 2024-08-13

## 2024-10-09 ENCOUNTER — OFFICE VISIT (OUTPATIENT)
Age: 74
End: 2024-10-09
Payer: MEDICARE

## 2024-10-09 VITALS
WEIGHT: 203 LBS | HEART RATE: 59 BPM | BODY MASS INDEX: 26.9 KG/M2 | SYSTOLIC BLOOD PRESSURE: 120 MMHG | HEIGHT: 73 IN | DIASTOLIC BLOOD PRESSURE: 65 MMHG | OXYGEN SATURATION: 95 %

## 2024-10-09 DIAGNOSIS — E11.9 TYPE 2 DIABETES MELLITUS WITHOUT COMPLICATION, WITHOUT LONG-TERM CURRENT USE OF INSULIN (HCC): ICD-10-CM

## 2024-10-09 DIAGNOSIS — I25.118 ATHEROSCLEROSIS OF NATIVE CORONARY ARTERY OF NATIVE HEART WITH STABLE ANGINA PECTORIS (HCC): Primary | ICD-10-CM

## 2024-10-09 DIAGNOSIS — I10 ESSENTIAL HYPERTENSION, BENIGN: ICD-10-CM

## 2024-10-09 DIAGNOSIS — Z95.1 POSTSURGICAL AORTOCORONARY BYPASS STATUS: ICD-10-CM

## 2024-10-09 DIAGNOSIS — E78.00 PURE HYPERCHOLESTEROLEMIA: ICD-10-CM

## 2024-10-09 PROCEDURE — 1036F TOBACCO NON-USER: CPT | Performed by: INTERNAL MEDICINE

## 2024-10-09 PROCEDURE — 3046F HEMOGLOBIN A1C LEVEL >9.0%: CPT | Performed by: INTERNAL MEDICINE

## 2024-10-09 PROCEDURE — G8484 FLU IMMUNIZE NO ADMIN: HCPCS | Performed by: INTERNAL MEDICINE

## 2024-10-09 PROCEDURE — 3078F DIAST BP <80 MM HG: CPT | Performed by: INTERNAL MEDICINE

## 2024-10-09 PROCEDURE — G8427 DOCREV CUR MEDS BY ELIG CLIN: HCPCS | Performed by: INTERNAL MEDICINE

## 2024-10-09 PROCEDURE — G8417 CALC BMI ABV UP PARAM F/U: HCPCS | Performed by: INTERNAL MEDICINE

## 2024-10-09 PROCEDURE — 99214 OFFICE O/P EST MOD 30 MIN: CPT | Performed by: INTERNAL MEDICINE

## 2024-10-09 PROCEDURE — 3017F COLORECTAL CA SCREEN DOC REV: CPT | Performed by: INTERNAL MEDICINE

## 2024-10-09 PROCEDURE — 1123F ACP DISCUSS/DSCN MKR DOCD: CPT | Performed by: INTERNAL MEDICINE

## 2024-10-09 PROCEDURE — 3074F SYST BP LT 130 MM HG: CPT | Performed by: INTERNAL MEDICINE

## 2024-10-09 PROCEDURE — 2022F DILAT RTA XM EVC RTNOPTHY: CPT | Performed by: INTERNAL MEDICINE

## 2024-10-09 ASSESSMENT — PATIENT HEALTH QUESTIONNAIRE - PHQ9
1. LITTLE INTEREST OR PLEASURE IN DOING THINGS: NOT AT ALL
SUM OF ALL RESPONSES TO PHQ QUESTIONS 1-9: 0
SUM OF ALL RESPONSES TO PHQ QUESTIONS 1-9: 0
SUM OF ALL RESPONSES TO PHQ9 QUESTIONS 1 & 2: 0
SUM OF ALL RESPONSES TO PHQ QUESTIONS 1-9: 0
SUM OF ALL RESPONSES TO PHQ QUESTIONS 1-9: 0
2. FEELING DOWN, DEPRESSED OR HOPELESS: NOT AT ALL

## 2024-10-09 ASSESSMENT — ENCOUNTER SYMPTOMS
EYES NEGATIVE: 1
GASTROINTESTINAL NEGATIVE: 1
RESPIRATORY NEGATIVE: 1

## 2024-10-09 NOTE — PROGRESS NOTES
1. Have you been to the ER, urgent care clinic since your last visit?  Hospitalized since your last visit?    no      2.  Where do you normally have your labs drawn?   [facility]    3. Do you need any refills today?   no    4. Which local pharmacy do you use (enter pharmacy)?   [facility] on [address]    5. Which mail order pharmacy do you use (enter pharmacy)?   [facility]     6. Are you here for surgical clearance and if so who will be doing your     procedure/surgery (care team)?   no

## 2024-10-09 NOTE — PROGRESS NOTES
True Camacho is a 74 y.o. year old male.    Patient with cad,post cabg, hypertension, hyperlipidemia  History of diabetes      2/2021  Patient is here for follow-up.  Patient is complain of shortness of breath on exertion that is new.  Denies orthopnea.  He denies any chest pain  9/2021  Patient is here for follow-up.  His shortness of breath is stable.  Denies any chest pain.  Denies any other significant symptoms  3/2022  Patient seen and for follow-up.  He has shortness of breath on exertion stable pattern.  No chest pain  4/10/2024 No significant chest pain, shortness of breath, edema, dizziness, palpitations or loss of consciousness.  Tries to walk a mile a day between his driving.            Review of Systems   Constitutional: Negative.    HENT: Negative.     Eyes: Negative.    Respiratory: Negative.     Cardiovascular: Negative.    Gastrointestinal: Negative.    Endocrine: Negative.    Genitourinary: Negative.    Musculoskeletal: Negative.    Neurological: Negative.    Psychiatric/Behavioral: Negative.     All other systems reviewed and are negative.        Physical Exam  Vitals and nursing note reviewed.   Constitutional:       Appearance: Normal appearance.   HENT:      Head: Normocephalic and atraumatic.      Nose: Nose normal.   Eyes:      Conjunctiva/sclera: Conjunctivae normal.   Cardiovascular:      Rate and Rhythm: Normal rate and regular rhythm.      Pulses: Normal pulses.      Heart sounds: Murmur heard.      High-pitched blowing midsystolic murmur is present with a grade of 3/6 at the upper left sternal border and apex.   Pulmonary:      Effort: Pulmonary effort is normal.      Breath sounds: Normal breath sounds.   Abdominal:      General: Bowel sounds are normal.      Palpations: Abdomen is soft.   Musculoskeletal:         General: Normal range of motion.      Right lower leg: No edema.      Left lower leg: No edema.   Skin:     General: Skin is warm and dry.   Neurological:      General: No

## 2025-01-09 DIAGNOSIS — I10 ESSENTIAL HYPERTENSION, BENIGN: ICD-10-CM

## 2025-01-09 DIAGNOSIS — I25.10 ATHEROSCLEROSIS OF NATIVE CORONARY ARTERY OF NATIVE HEART WITHOUT ANGINA PECTORIS: ICD-10-CM

## 2025-01-09 RX ORDER — AMLODIPINE BESYLATE 2.5 MG/1
2.5 TABLET ORAL DAILY
Qty: 90 TABLET | Refills: 3 | Status: SHIPPED | OUTPATIENT
Start: 2025-01-09

## 2025-02-25 DIAGNOSIS — I25.10 ATHEROSCLEROSIS OF NATIVE CORONARY ARTERY OF NATIVE HEART WITHOUT ANGINA PECTORIS: ICD-10-CM

## 2025-02-25 DIAGNOSIS — I10 ESSENTIAL HYPERTENSION, BENIGN: ICD-10-CM

## 2025-02-25 RX ORDER — AMLODIPINE BESYLATE 2.5 MG/1
2.5 TABLET ORAL DAILY
Qty: 90 TABLET | Refills: 3 | Status: SHIPPED | OUTPATIENT
Start: 2025-02-25

## 2025-02-25 NOTE — TELEPHONE ENCOUNTER
Requested Prescriptions     Pending Prescriptions Disp Refills    amLODIPine (NORVASC) 2.5 MG tablet 90 tablet 3     Sig: Take 1 tablet by mouth daily

## 2025-02-28 DIAGNOSIS — I10 ESSENTIAL (PRIMARY) HYPERTENSION: ICD-10-CM

## 2025-02-28 RX ORDER — METOPROLOL TARTRATE 50 MG
50 TABLET ORAL 2 TIMES DAILY
Qty: 180 TABLET | Refills: 3 | Status: SHIPPED | OUTPATIENT
Start: 2025-02-28

## 2025-03-21 RX ORDER — ATORVASTATIN CALCIUM 40 MG/1
40 TABLET, FILM COATED ORAL DAILY
Qty: 90 TABLET | Refills: 3 | Status: SHIPPED | OUTPATIENT
Start: 2025-03-21

## 2025-04-25 ENCOUNTER — HOSPITAL ENCOUNTER (OUTPATIENT)
Facility: HOSPITAL | Age: 75
Discharge: HOME | End: 2025-04-28
Payer: MEDICARE

## 2025-04-25 DIAGNOSIS — R97.20 ELEVATED PSA: ICD-10-CM

## 2025-04-25 DIAGNOSIS — N40.0 BENIGN PROSTATIC HYPERPLASIA WITHOUT LOWER URINARY TRACT SYMPTOMS: ICD-10-CM

## 2025-04-25 PROCEDURE — A9577 INJ MULTIHANCE: HCPCS

## 2025-04-25 PROCEDURE — 6360000004 HC RX CONTRAST MEDICATION

## 2025-04-25 PROCEDURE — 72197 MRI PELVIS W/O & W/DYE: CPT

## 2025-04-25 PROCEDURE — 82565 ASSAY OF CREATININE: CPT

## 2025-04-25 RX ADMIN — GADOBENATE DIMEGLUMINE 20 ML: 529 INJECTION, SOLUTION INTRAVENOUS at 09:53

## 2025-04-27 LAB — CREAT UR-MCNC: 1 MG/DL (ref 0.6–1.3)

## 2025-04-30 LAB
A/G RATIO: 1.6 RATIO (ref 1.1–2.6)
ALBUMIN: 4.5 G/DL (ref 3.5–5)
ALP BLD-CCNC: 68 U/L (ref 40–125)
ALT SERPL-CCNC: 24 U/L (ref 5–40)
AST SERPL-CCNC: 20 U/L (ref 10–37)
BILIRUB SERPL-MCNC: 0.6 MG/DL (ref 0.2–1.2)
BILIRUBIN DIRECT: <0.2 MG/DL (ref 0–0.3)
CHOLESTEROL, TOTAL: 134 MG/DL (ref 110–200)
CHOLESTEROL/HDL RATIO: 3.4 (ref 0–5)
GLOBULIN: 2.9 G/DL (ref 2–4)
HDLC SERPL-MCNC: 39 MG/DL
LDL CHOLESTEROL: 62 MG/DL (ref 50–99)
LDL/HDL RATIO: 1.6
NON-HDL CHOLESTEROL: 95 MG/DL
TOTAL PROTEIN: 7.4 G/DL (ref 6.2–8.1)
TRIGL SERPL-MCNC: 161 MG/DL (ref 40–149)
VLDLC SERPL CALC-MCNC: 32 MG/DL (ref 8–30)

## 2025-05-01 ENCOUNTER — RESULTS FOLLOW-UP (OUTPATIENT)
Age: 75
End: 2025-05-01

## 2025-05-01 NOTE — TELEPHONE ENCOUNTER
Called pt and reinforced diet and exercise and we discussed his triglycerides now vs 2 years ago. He was very understanding and looks forward to seeing us later this month.

## 2025-05-01 NOTE — TELEPHONE ENCOUNTER
----- Message from LILLIE ALMONTE LPN sent at 5/1/2025  8:50 AM EDT -----    ----- Message -----  From: Cathy Baron MD  Sent: 5/1/2025   8:46 AM EDT  To: #    Please contact patient and do the following asap  Please tell him if we can lose 5 to 10 pounds, his triglycerides may also get better as they were 2 years ago.  Please reinforce diet and exercise.

## 2025-05-01 NOTE — RESULT ENCOUNTER NOTE
Please contact patient and do the following asap  Please tell him if we can lose 5 to 10 pounds, his triglycerides may also get better as they were 2 years ago.  Please reinforce diet and exercise.

## 2025-05-08 ENCOUNTER — OFFICE VISIT (OUTPATIENT)
Age: 75
End: 2025-05-08
Payer: MEDICARE

## 2025-05-08 VITALS
HEIGHT: 73 IN | DIASTOLIC BLOOD PRESSURE: 64 MMHG | HEART RATE: 58 BPM | SYSTOLIC BLOOD PRESSURE: 120 MMHG | WEIGHT: 197 LBS | BODY MASS INDEX: 26.11 KG/M2 | OXYGEN SATURATION: 96 %

## 2025-05-08 DIAGNOSIS — I25.118 ATHEROSCLEROSIS OF NATIVE CORONARY ARTERY OF NATIVE HEART WITH STABLE ANGINA PECTORIS: Primary | ICD-10-CM

## 2025-05-08 DIAGNOSIS — I10 ESSENTIAL HYPERTENSION, BENIGN: ICD-10-CM

## 2025-05-08 DIAGNOSIS — Z95.1 POSTSURGICAL AORTOCORONARY BYPASS STATUS: ICD-10-CM

## 2025-05-08 DIAGNOSIS — Z79.4 TYPE 2 DIABETES MELLITUS WITHOUT COMPLICATION, WITH LONG-TERM CURRENT USE OF INSULIN (HCC): ICD-10-CM

## 2025-05-08 DIAGNOSIS — E78.00 PURE HYPERCHOLESTEROLEMIA: ICD-10-CM

## 2025-05-08 DIAGNOSIS — E11.9 TYPE 2 DIABETES MELLITUS WITHOUT COMPLICATION, WITH LONG-TERM CURRENT USE OF INSULIN (HCC): ICD-10-CM

## 2025-05-08 PROCEDURE — 1160F RVW MEDS BY RX/DR IN RCRD: CPT | Performed by: INTERNAL MEDICINE

## 2025-05-08 PROCEDURE — G8427 DOCREV CUR MEDS BY ELIG CLIN: HCPCS | Performed by: INTERNAL MEDICINE

## 2025-05-08 PROCEDURE — 1036F TOBACCO NON-USER: CPT | Performed by: INTERNAL MEDICINE

## 2025-05-08 PROCEDURE — 3074F SYST BP LT 130 MM HG: CPT | Performed by: INTERNAL MEDICINE

## 2025-05-08 PROCEDURE — 3017F COLORECTAL CA SCREEN DOC REV: CPT | Performed by: INTERNAL MEDICINE

## 2025-05-08 PROCEDURE — 1123F ACP DISCUSS/DSCN MKR DOCD: CPT | Performed by: INTERNAL MEDICINE

## 2025-05-08 PROCEDURE — G8417 CALC BMI ABV UP PARAM F/U: HCPCS | Performed by: INTERNAL MEDICINE

## 2025-05-08 PROCEDURE — 3078F DIAST BP <80 MM HG: CPT | Performed by: INTERNAL MEDICINE

## 2025-05-08 PROCEDURE — 3046F HEMOGLOBIN A1C LEVEL >9.0%: CPT | Performed by: INTERNAL MEDICINE

## 2025-05-08 PROCEDURE — 2022F DILAT RTA XM EVC RTNOPTHY: CPT | Performed by: INTERNAL MEDICINE

## 2025-05-08 PROCEDURE — 1159F MED LIST DOCD IN RCRD: CPT | Performed by: INTERNAL MEDICINE

## 2025-05-08 PROCEDURE — 1126F AMNT PAIN NOTED NONE PRSNT: CPT | Performed by: INTERNAL MEDICINE

## 2025-05-08 PROCEDURE — 99214 OFFICE O/P EST MOD 30 MIN: CPT | Performed by: INTERNAL MEDICINE

## 2025-05-08 ASSESSMENT — PATIENT HEALTH QUESTIONNAIRE - PHQ9
SUM OF ALL RESPONSES TO PHQ QUESTIONS 1-9: 0
1. LITTLE INTEREST OR PLEASURE IN DOING THINGS: NOT AT ALL
2. FEELING DOWN, DEPRESSED OR HOPELESS: NOT AT ALL
SUM OF ALL RESPONSES TO PHQ QUESTIONS 1-9: 0

## 2025-05-08 ASSESSMENT — ENCOUNTER SYMPTOMS
EYES NEGATIVE: 1
GASTROINTESTINAL NEGATIVE: 1
RESPIRATORY NEGATIVE: 1

## 2025-05-08 NOTE — PROGRESS NOTES
True Camacho is a 74 y.o. year old male.    Patient with cad,post cabg, hypertension, hyperlipidemia  History of diabetes      2/2021  Patient is here for follow-up.  Patient is complain of shortness of breath on exertion that is new.  Denies orthopnea.  He denies any chest pain  9/2021  Patient is here for follow-up.  His shortness of breath is stable.  Denies any chest pain.  Denies any other significant symptoms  3/2022  Patient seen and for follow-up.  He has shortness of breath on exertion stable pattern.  No chest pain  4/10/2024 No significant chest pain, shortness of breath, edema, dizziness, palpitations or loss of consciousness.  Tries to walk a mile a day between his driving.  5/8/2025 no significant chest pain dyspnea edema dizziness palpitations.  Not walking as much.            Review of Systems   Constitutional: Negative.    HENT: Negative.     Eyes: Negative.    Respiratory: Negative.     Cardiovascular: Negative.    Gastrointestinal: Negative.    Endocrine: Negative.    Genitourinary: Negative.    Musculoskeletal: Negative.    Neurological: Negative.    Psychiatric/Behavioral: Negative.     All other systems reviewed and are negative.        Physical Exam  Vitals and nursing note reviewed.   Constitutional:       Appearance: Normal appearance.   HENT:      Head: Normocephalic and atraumatic.      Nose: Nose normal.   Eyes:      Conjunctiva/sclera: Conjunctivae normal.   Cardiovascular:      Rate and Rhythm: Normal rate and regular rhythm.      Pulses: Normal pulses.      Heart sounds: Murmur heard.      High-pitched blowing midsystolic murmur is present with a grade of 3/6 at the upper left sternal border and apex.   Pulmonary:      Effort: Pulmonary effort is normal.      Breath sounds: Normal breath sounds.   Abdominal:      General: Bowel sounds are normal.      Palpations: Abdomen is soft.   Musculoskeletal:         General: Normal range of motion.      Right lower leg: No edema.      Left

## 2025-05-08 NOTE — PROGRESS NOTES
1. Have you been to the ER, urgent care clinic since your last visit?  Hospitalized since your last visit?     No      2.  Where do you normally have your labs drawn?   [facility]    3. Do you need any refills today?   No    4. Which local pharmacy do you use (enter pharmacy)?   [facility]    5. Which mail order pharmacy do you use (enter pharmacy)?   [facility]     6. Are you here for surgical clearance and if so who will be doing your     procedure/surgery (care team)?   No

## 2025-05-26 DIAGNOSIS — I25.10 ATHEROSCLEROSIS OF NATIVE CORONARY ARTERY OF NATIVE HEART WITHOUT ANGINA PECTORIS: ICD-10-CM

## 2025-05-27 RX ORDER — CLOPIDOGREL BISULFATE 75 MG/1
75 TABLET ORAL DAILY
Qty: 90 TABLET | Refills: 3 | Status: SHIPPED | OUTPATIENT
Start: 2025-05-27

## (undated) DEVICE — SOLUTION IRRIG 1000ML H2O STRL BLT

## (undated) DEVICE — SYR 50ML SLIP TIP NSAF LF STRL --

## (undated) DEVICE — GAUZE,SPONGE,4"X4",16PLY,STRL,LF,10/TRAY: Brand: MEDLINE

## (undated) DEVICE — CATHETER SUCT TR FL TIP 14FR W/ O CTRL

## (undated) DEVICE — FLEX ADVANTAGE 3000CC: Brand: FLEX ADVANTAGE

## (undated) DEVICE — FLUFF AND POLYMER UNDERPAD,EXTRA HEAVY: Brand: WINGS

## (undated) DEVICE — SYRINGE MED 50ML LUERSLIP TIP

## (undated) DEVICE — SYRINGE MED 25GA 3ML L5/8IN SUBQ PLAS W/ DETACH NDL SFTY

## (undated) DEVICE — ENDOSCOPY PUMP TUBING/ CAP SET: Brand: ERBE

## (undated) DEVICE — GOWN PLASTIC FILM THMBHKS UNIV BLUE: Brand: CARDINAL HEALTH

## (undated) DEVICE — SNARE POLYP M W27MMXL240CM OVL STIFF DISP CAPTIVATOR

## (undated) DEVICE — CANNULA ORIG TL CLR W FOAM CUSHIONS AND 14FT SUPL TB 3 CHN

## (undated) DEVICE — SYR 10ML LUER LOK 1/5ML GRAD --

## (undated) DEVICE — SYRINGE 20ML LL S/C 50

## (undated) DEVICE — SNARE ENDO 2.4MMX230CM -- COLD EXACTO

## (undated) DEVICE — SYRINGE MED 10ML LUERLOCK TIP W/O SFTY DISP

## (undated) DEVICE — AIRLIFE™ NASAL OXYGEN CANNULA CURVED, NONFLARED TIP WITH 14 FOOT (4.3 M) CRUSH-RESISTANT TUBING, OVER-THE-EAR STYLE: Brand: AIRLIFE™

## (undated) DEVICE — MEDI-VAC SUCTION HIGH CAPACITY: Brand: CARDINAL HEALTH

## (undated) DEVICE — UNDERPAD INCONT W23XL36IN STD BLU POLYPR BK FLUF SFT

## (undated) DEVICE — GOWN ISOL IMPERV UNIV, DISP, OPEN BACK, BLUE --

## (undated) DEVICE — TRAP SPEC POLYP REM STRNR CLN DSGN MAGNIFYING WIND DISP

## (undated) DEVICE — MEDI-VAC NON-CONDUCTIVE SUCTION TUBING: Brand: CARDINAL HEALTH

## (undated) DEVICE — SYR 20ML LL STRL LF --

## (undated) DEVICE — YANKAUER,SMOOTH HANDLE,HIGH CAPACITY: Brand: MEDLINE INDUSTRIES, INC.

## (undated) DEVICE — CANNULA NSL AD TBNG L14FT STD PVC O2 CRV CONN NONFLARED NSL

## (undated) DEVICE — TRAP SPEC COLL POLYP POLYSTYR --

## (undated) DEVICE — FORCEPS BX L240CM JAW DIA2.8MM L CAP W/ NDL MIC MESH TOOTH

## (undated) DEVICE — LINER SUCT CANSTR 3000CC PLAS SFT PRE ASSEMB W/OUT TBNG W/